# Patient Record
Sex: MALE | Race: WHITE | URBAN - METROPOLITAN AREA
[De-identification: names, ages, dates, MRNs, and addresses within clinical notes are randomized per-mention and may not be internally consistent; named-entity substitution may affect disease eponyms.]

---

## 2024-10-05 ENCOUNTER — APPOINTMENT (OUTPATIENT)
Age: 69
DRG: 321 | End: 2024-10-05
Attending: INTERNAL MEDICINE
Payer: MEDICARE

## 2024-10-05 ENCOUNTER — APPOINTMENT (OUTPATIENT)
Dept: CT IMAGING | Age: 69
DRG: 321 | End: 2024-10-05
Payer: MEDICARE

## 2024-10-05 ENCOUNTER — HOSPITAL ENCOUNTER (INPATIENT)
Age: 69
LOS: 5 days | Discharge: HOME | DRG: 321 | End: 2024-10-10
Attending: EMERGENCY MEDICINE | Admitting: STUDENT IN AN ORGANIZED HEALTH CARE EDUCATION/TRAINING PROGRAM
Payer: MEDICARE

## 2024-10-05 ENCOUNTER — APPOINTMENT (OUTPATIENT)
Dept: GENERAL RADIOLOGY | Age: 69
DRG: 321 | End: 2024-10-05
Payer: MEDICARE

## 2024-10-05 DIAGNOSIS — I25.5 ISCHEMIC CARDIOMYOPATHY: ICD-10-CM

## 2024-10-05 DIAGNOSIS — I21.3 ST ELEVATION MYOCARDIAL INFARCTION (STEMI), UNSPECIFIED ARTERY (HCC): Primary | ICD-10-CM

## 2024-10-05 DIAGNOSIS — Z48.89 ENCOUNTER FOR OTHER SPECIFIED SURGICAL AFTERCARE: ICD-10-CM

## 2024-10-05 DIAGNOSIS — I46.9 CARDIAC ARREST: ICD-10-CM

## 2024-10-05 DIAGNOSIS — I21.29 ACUTE POSTERIOR MYOCARDIAL INFARCTION (HCC): ICD-10-CM

## 2024-10-05 DIAGNOSIS — I49.01 VENTRICULAR FIBRILLATION: ICD-10-CM

## 2024-10-05 DIAGNOSIS — I21.3 STEMI (ST ELEVATION MYOCARDIAL INFARCTION) (HCC): ICD-10-CM

## 2024-10-05 LAB
ALBUMIN SERPL-MCNC: 1.9 G/DL (ref 3.4–5)
ALBUMIN/GLOB SERPL: 1.6 {RATIO} (ref 1.1–2.2)
ALP SERPL-CCNC: 39 U/L (ref 40–129)
ALT SERPL-CCNC: 143 U/L (ref 10–40)
ANION GAP SERPL CALCULATED.3IONS-SCNC: 12 MMOL/L (ref 3–16)
ANION GAP SERPL CALCULATED.3IONS-SCNC: 8 MMOL/L (ref 3–16)
AST SERPL-CCNC: 99 U/L (ref 15–37)
BASE EXCESS BLDA CALC-SCNC: -5 MMOL/L (ref -3–3)
BASE EXCESS BLDA CALC-SCNC: -6 MMOL/L (ref -3–3)
BASE EXCESS BLDA CALC-SCNC: 0 MMOL/L (ref -3–3)
BASE EXCESS BLDV CALC-SCNC: -16 MMOL/L (ref -3–3)
BASOPHILS # BLD: 0.1 K/UL (ref 0–0.2)
BASOPHILS # BLD: 0.1 K/UL (ref 0–0.2)
BASOPHILS NFR BLD: 0.3 %
BASOPHILS NFR BLD: 0.8 %
BILIRUB SERPL-MCNC: <0.2 MG/DL (ref 0–1)
BUN SERPL-MCNC: 12 MG/DL (ref 7–20)
BUN SERPL-MCNC: 8 MG/DL (ref 7–20)
CA-I BLD-SCNC: 1.21 MMOL/L (ref 1.12–1.32)
CA-I BLD-SCNC: 1.3 MMOL/L (ref 1.12–1.32)
CALCIUM SERPL-MCNC: 4.2 MG/DL (ref 8.3–10.6)
CALCIUM SERPL-MCNC: 8.8 MG/DL (ref 8.3–10.6)
CHLORIDE BLD-SCNC: 108 MMOL/L (ref 99–110)
CHLORIDE SERPL-SCNC: 104 MMOL/L (ref 99–110)
CHLORIDE SERPL-SCNC: 120 MMOL/L (ref 99–110)
CO2 BLDA-SCNC: 22 MMOL/L
CO2 BLDA-SCNC: 24 MMOL/L
CO2 BLDA-SCNC: 28 MMOL/L
CO2 BLDV-SCNC: 11 MMOL/L
CO2 SERPL-SCNC: 12 MMOL/L (ref 21–32)
CO2 SERPL-SCNC: 26 MMOL/L (ref 21–32)
COHGB MFR BLDV: 2.9 % (ref 0–1.5)
CREAT SERPL-MCNC: 1 MG/DL (ref 0.8–1.3)
CREAT SERPL-MCNC: <0.5 MG/DL (ref 0.8–1.3)
DEPRECATED RDW RBC AUTO: 13.2 % (ref 12.4–15.4)
DEPRECATED RDW RBC AUTO: 13.3 % (ref 12.4–15.4)
ECHO AO ROOT DIAM: 3.6 CM
ECHO AO ROOT INDEX: 1.75 CM/M2
ECHO AV AREA PEAK VELOCITY: 2.4 CM2
ECHO AV AREA/BSA PEAK VELOCITY: 1.2 CM2/M2
ECHO AV CUSP MM: 2.3 CM
ECHO AV PEAK GRADIENT: 5 MMHG
ECHO AV PEAK VELOCITY: 1.2 M/S
ECHO AV VELOCITY RATIO: 0.58
ECHO EST RA PRESSURE: 8 MMHG
ECHO IVC PROX: 1.9 CM
ECHO LA AREA 4C: 14.4 CM2
ECHO LA DIAMETER INDEX: 1.5 CM/M2
ECHO LA DIAMETER: 3.1 CM
ECHO LA MAJOR AXIS: 4.7 CM
ECHO LA TO AORTIC ROOT RATIO: 0.86
ECHO LA VOL MOD A2C: 23 ML (ref 18–58)
ECHO LA VOL MOD A4C: 36 ML (ref 18–58)
ECHO LA VOLUME INDEX MOD A2C: 11 ML/M2 (ref 16–34)
ECHO LA VOLUME INDEX MOD A4C: 17 ML/M2 (ref 16–34)
ECHO LV E' LATERAL VELOCITY: 7.9 CM/S
ECHO LV E' SEPTAL VELOCITY: 6.2 CM/S
ECHO LV EDV A2C: 118 ML
ECHO LV EDV A4C: 142 ML
ECHO LV EDV INDEX A4C: 69 ML/M2
ECHO LV EDV NDEX A2C: 57 ML/M2
ECHO LV EF PHYSICIAN: 45 %
ECHO LV EJECTION FRACTION A2C: 58 %
ECHO LV EJECTION FRACTION A4C: 45 %
ECHO LV EJECTION FRACTION BIPLANE: 51 % (ref 55–100)
ECHO LV ESV A2C: 50 ML
ECHO LV ESV A4C: 78 ML
ECHO LV ESV INDEX A2C: 24 ML/M2
ECHO LV ESV INDEX A4C: 38 ML/M2
ECHO LV FRACTIONAL SHORTENING: 16 % (ref 28–44)
ECHO LV INTERNAL DIMENSION DIASTOLE INDEX: 2.14 CM/M2
ECHO LV INTERNAL DIMENSION DIASTOLIC: 4.4 CM (ref 4.2–5.9)
ECHO LV INTERNAL DIMENSION SYSTOLIC INDEX: 1.8 CM/M2
ECHO LV INTERNAL DIMENSION SYSTOLIC: 3.7 CM
ECHO LV ISOVOLUMETRIC RELAXATION TIME (IVRT): 106 MS
ECHO LV IVSD: 1 CM (ref 0.6–1)
ECHO LV MASS 2D: 147.8 G (ref 88–224)
ECHO LV MASS INDEX 2D: 71.8 G/M2 (ref 49–115)
ECHO LV POSTERIOR WALL DIASTOLIC: 1 CM (ref 0.6–1)
ECHO LV RELATIVE WALL THICKNESS RATIO: 0.45
ECHO LVOT AREA: 3.8 CM2
ECHO LVOT DIAM: 2.2 CM
ECHO LVOT MEAN GRADIENT: 1 MMHG
ECHO LVOT PEAK GRADIENT: 2 MMHG
ECHO LVOT PEAK VELOCITY: 0.7 M/S
ECHO LVOT STROKE VOLUME INDEX: 29.1 ML/M2
ECHO LVOT SV: 60 ML
ECHO LVOT VTI: 15.8 CM
ECHO MV A VELOCITY: 0.6 M/S
ECHO MV E DECELERATION TIME (DT): 187 MS
ECHO MV E VELOCITY: 0.72 M/S
ECHO MV E/A RATIO: 1.2
ECHO MV E/E' LATERAL: 9.11
ECHO MV E/E' RATIO (AVERAGED): 10.36
ECHO MV E/E' SEPTAL: 11.61
ECHO RA AREA 4C: 14.8 CM2
ECHO RA END SYSTOLIC VOLUME APICAL 4 CHAMBER INDEX BSA: 17 ML/M2
ECHO RA VOLUME: 35 ML
ECHO RIGHT VENTRICULAR SYSTOLIC PRESSURE (RVSP): 43 MMHG
ECHO RV FREE WALL PEAK S': 10.9 CM/S
ECHO RV INTERNAL DIMENSION: 3.3 CM
ECHO RV TAPSE: 1.6 CM (ref 1.7–?)
ECHO TV REGURGITANT MAX VELOCITY: 2.97 M/S
ECHO TV REGURGITANT PEAK GRADIENT: 35 MMHG
EOSINOPHIL # BLD: 0 K/UL (ref 0–0.6)
EOSINOPHIL # BLD: 0.1 K/UL (ref 0–0.6)
EOSINOPHIL NFR BLD: 0.1 %
EOSINOPHIL NFR BLD: 1.1 %
GFR SERPLBLD CREATININE-BSD FMLA CKD-EPI: 83 ML/MIN/{1.73_M2}
GFR SERPLBLD CREATININE-BSD FMLA CKD-EPI: >90 ML/MIN/{1.73_M2}
GLUCOSE BLD-MCNC: 157 MG/DL (ref 70–99)
GLUCOSE BLD-MCNC: 163 MG/DL (ref 70–99)
GLUCOSE BLD-MCNC: 187 MG/DL (ref 70–99)
GLUCOSE SERPL-MCNC: 104 MG/DL (ref 70–99)
GLUCOSE SERPL-MCNC: 161 MG/DL (ref 70–99)
HCO3 BLDA-SCNC: 20.9 MMOL/L (ref 21–29)
HCO3 BLDA-SCNC: 22.1 MMOL/L (ref 21–29)
HCO3 BLDA-SCNC: 26 MMOL/L (ref 21–29)
HCO3 BLDV-SCNC: 9.8 MMOL/L (ref 23–29)
HCT VFR BLD AUTO: 29.5 % (ref 40.5–52.5)
HCT VFR BLD AUTO: 37 % (ref 40.5–52.5)
HCT VFR BLD AUTO: 40.3 % (ref 40.5–52.5)
HGB BLD CALC-MCNC: 12.7 GM/DL (ref 13.5–17.5)
HGB BLD-MCNC: 10 G/DL (ref 13.5–17.5)
HGB BLD-MCNC: 13.7 G/DL (ref 13.5–17.5)
LACTATE BLD-SCNC: 1.8 MMOL/L (ref 0.4–2)
LACTATE BLD-SCNC: 1.87 MMOL/L (ref 0.4–2)
LACTATE BLDV-SCNC: 5.4 MMOL/L (ref 0.4–2)
LYMPHOCYTES # BLD: 1.1 K/UL (ref 1–5.1)
LYMPHOCYTES # BLD: 3.8 K/UL (ref 1–5.1)
LYMPHOCYTES NFR BLD: 49.1 %
LYMPHOCYTES NFR BLD: 7.4 %
MAGNESIUM SERPL-MCNC: 1.1 MG/DL (ref 1.8–2.4)
MCH RBC QN AUTO: 31.8 PG (ref 26–34)
MCH RBC QN AUTO: 31.9 PG (ref 26–34)
MCHC RBC AUTO-ENTMCNC: 33.8 G/DL (ref 31–36)
MCHC RBC AUTO-ENTMCNC: 33.9 G/DL (ref 31–36)
MCV RBC AUTO: 94 FL (ref 80–100)
MCV RBC AUTO: 94.2 FL (ref 80–100)
METHGB MFR BLDV: 0.3 %
MONOCYTES # BLD: 0.7 K/UL (ref 0–1.3)
MONOCYTES # BLD: 1.2 K/UL (ref 0–1.3)
MONOCYTES NFR BLD: 8.3 %
MONOCYTES NFR BLD: 9.2 %
NEUTROPHILS # BLD: 12.4 K/UL (ref 1.7–7.7)
NEUTROPHILS # BLD: 3.1 K/UL (ref 1.7–7.7)
NEUTROPHILS NFR BLD: 39.8 %
NEUTROPHILS NFR BLD: 83.9 %
NT-PROBNP SERPL-MCNC: 61 PG/ML (ref 0–124)
O2 THERAPY: ABNORMAL
PCO2 BLDA: 45.9 MM HG (ref 35–45)
PCO2 BLDA: 47.5 MM HG (ref 35–45)
PCO2 BLDA: 51.2 MM HG (ref 35–45)
PCO2 BLDV: 23.6 MMHG (ref 40–50)
PERFORMED ON: ABNORMAL
PH BLDA: 7.25 [PH] (ref 7.35–7.45)
PH BLDA: 7.29 [PH] (ref 7.35–7.45)
PH BLDA: 7.31 [PH] (ref 7.35–7.45)
PH BLDV: 7.24 [PH] (ref 7.35–7.45)
PH BLDV: 7.24 [PH] (ref 7.35–7.45)
PLATELET # BLD AUTO: 137 K/UL (ref 135–450)
PLATELET # BLD AUTO: 209 K/UL (ref 135–450)
PMV BLD AUTO: 7.4 FL (ref 5–10.5)
PMV BLD AUTO: 7.5 FL (ref 5–10.5)
PO2 BLDA: 120.1 MM HG (ref 75–108)
PO2 BLDA: 78.8 MM HG (ref 75–108)
PO2 BLDA: 80.5 MM HG (ref 75–108)
PO2 BLDV: 78.7 MMHG (ref 25–40)
POC ACT LR: 278 SEC
POC ACT LR: 334 SEC
POC ACT LR: 346 SEC
POC CREATININE: 0.7 MG/DL (ref 0.8–1.3)
POC SAMPLE TYPE: ABNORMAL
POTASSIUM BLD-SCNC: 3.7 MMOL/L (ref 3.5–5.1)
POTASSIUM BLD-SCNC: 3.8 MMOL/L (ref 3.5–5.1)
POTASSIUM SERPL-SCNC: 1.9 MMOL/L (ref 3.5–5.1)
POTASSIUM SERPL-SCNC: 4.5 MMOL/L (ref 3.5–5.1)
PROT SERPL-MCNC: 3.1 G/DL (ref 6.4–8.2)
RBC # BLD AUTO: 3.14 M/UL (ref 4.2–5.9)
RBC # BLD AUTO: 4.28 M/UL (ref 4.2–5.9)
SAO2 % BLDA: 94 % (ref 93–100)
SAO2 % BLDA: 94 % (ref 93–100)
SAO2 % BLDA: 98 % (ref 93–100)
SAO2 % BLDV: 94 %
SODIUM BLD-SCNC: 137 MMOL/L (ref 136–145)
SODIUM SERPL-SCNC: 138 MMOL/L (ref 136–145)
SODIUM SERPL-SCNC: 144 MMOL/L (ref 136–145)
TROPONIN, HIGH SENSITIVITY: <6 NG/L (ref 0–22)
WBC # BLD AUTO: 14.7 K/UL (ref 4–11)
WBC # BLD AUTO: 7.7 K/UL (ref 4–11)

## 2024-10-05 PROCEDURE — C1769 GUIDE WIRE: HCPCS | Performed by: INTERNAL MEDICINE

## 2024-10-05 PROCEDURE — C1725 CATH, TRANSLUMIN NON-LASER: HCPCS | Performed by: INTERNAL MEDICINE

## 2024-10-05 PROCEDURE — 99291 CRITICAL CARE FIRST HOUR: CPT | Performed by: INTERNAL MEDICINE

## 2024-10-05 PROCEDURE — 027034Z DILATION OF CORONARY ARTERY, ONE ARTERY WITH DRUG-ELUTING INTRALUMINAL DEVICE, PERCUTANEOUS APPROACH: ICD-10-PCS | Performed by: INTERNAL MEDICINE

## 2024-10-05 PROCEDURE — 99153 MOD SED SAME PHYS/QHP EA: CPT | Performed by: INTERNAL MEDICINE

## 2024-10-05 PROCEDURE — 92978 ENDOLUMINL IVUS OCT C 1ST: CPT | Performed by: INTERNAL MEDICINE

## 2024-10-05 PROCEDURE — 74176 CT ABD & PELVIS W/O CONTRAST: CPT

## 2024-10-05 PROCEDURE — 2500000003 HC RX 250 WO HCPCS: Performed by: INTERNAL MEDICINE

## 2024-10-05 PROCEDURE — 99152 MOD SED SAME PHYS/QHP 5/>YRS: CPT | Performed by: INTERNAL MEDICINE

## 2024-10-05 PROCEDURE — 2500000003 HC RX 250 WO HCPCS: Performed by: EMERGENCY MEDICINE

## 2024-10-05 PROCEDURE — 85014 HEMATOCRIT: CPT

## 2024-10-05 PROCEDURE — 74018 RADEX ABDOMEN 1 VIEW: CPT

## 2024-10-05 PROCEDURE — 83880 ASSAY OF NATRIURETIC PEPTIDE: CPT

## 2024-10-05 PROCEDURE — 2580000003 HC RX 258: Performed by: INTERNAL MEDICINE

## 2024-10-05 PROCEDURE — 94761 N-INVAS EAR/PLS OXIMETRY MLT: CPT

## 2024-10-05 PROCEDURE — C8929 TTE W OR WO FOL WCON,DOPPLER: HCPCS

## 2024-10-05 PROCEDURE — 6360000002 HC RX W HCPCS: Performed by: INTERNAL MEDICINE

## 2024-10-05 PROCEDURE — B2151ZZ FLUOROSCOPY OF LEFT HEART USING LOW OSMOLAR CONTRAST: ICD-10-PCS | Performed by: INTERNAL MEDICINE

## 2024-10-05 PROCEDURE — 82803 BLOOD GASES ANY COMBINATION: CPT

## 2024-10-05 PROCEDURE — C1753 CATH, INTRAVAS ULTRASOUND: HCPCS | Performed by: INTERNAL MEDICINE

## 2024-10-05 PROCEDURE — 36556 INSERT NON-TUNNEL CV CATH: CPT | Performed by: INTERNAL MEDICINE

## 2024-10-05 PROCEDURE — 82565 ASSAY OF CREATININE: CPT

## 2024-10-05 PROCEDURE — C1751 CATH, INF, PER/CENT/MIDLINE: HCPCS | Performed by: INTERNAL MEDICINE

## 2024-10-05 PROCEDURE — 5A1221J PERFORMANCE OF CARDIAC OUTPUT, CONTINUOUS, AUTOMATED: ICD-10-PCS | Performed by: INTERNAL MEDICINE

## 2024-10-05 PROCEDURE — 6370000000 HC RX 637 (ALT 250 FOR IP)

## 2024-10-05 PROCEDURE — 85347 COAGULATION TIME ACTIVATED: CPT

## 2024-10-05 PROCEDURE — 82947 ASSAY GLUCOSE BLOOD QUANT: CPT

## 2024-10-05 PROCEDURE — 99285 EMERGENCY DEPT VISIT HI MDM: CPT

## 2024-10-05 PROCEDURE — 6360000004 HC RX CONTRAST MEDICATION: Performed by: INTERNAL MEDICINE

## 2024-10-05 PROCEDURE — C1874 STENT, COATED/COV W/DEL SYS: HCPCS | Performed by: INTERNAL MEDICINE

## 2024-10-05 PROCEDURE — 93005 ELECTROCARDIOGRAM TRACING: CPT | Performed by: INTERNAL MEDICINE

## 2024-10-05 PROCEDURE — 36415 COLL VENOUS BLD VENIPUNCTURE: CPT

## 2024-10-05 PROCEDURE — 6360000002 HC RX W HCPCS: Performed by: EMERGENCY MEDICINE

## 2024-10-05 PROCEDURE — B2111ZZ FLUOROSCOPY OF MULTIPLE CORONARY ARTERIES USING LOW OSMOLAR CONTRAST: ICD-10-PCS | Performed by: INTERNAL MEDICINE

## 2024-10-05 PROCEDURE — 5A1945Z RESPIRATORY VENTILATION, 24-96 CONSECUTIVE HOURS: ICD-10-PCS | Performed by: INTERNAL MEDICINE

## 2024-10-05 PROCEDURE — 96374 THER/PROPH/DIAG INJ IV PUSH: CPT

## 2024-10-05 PROCEDURE — 84295 ASSAY OF SERUM SODIUM: CPT

## 2024-10-05 PROCEDURE — 94002 VENT MGMT INPAT INIT DAY: CPT

## 2024-10-05 PROCEDURE — 4A023N7 MEASUREMENT OF CARDIAC SAMPLING AND PRESSURE, LEFT HEART, PERCUTANEOUS APPROACH: ICD-10-PCS | Performed by: INTERNAL MEDICINE

## 2024-10-05 PROCEDURE — 92941 PRQ TRLML REVSC TOT OCCL AMI: CPT | Performed by: INTERNAL MEDICINE

## 2024-10-05 PROCEDURE — 93306 TTE W/DOPPLER COMPLETE: CPT | Performed by: INTERNAL MEDICINE

## 2024-10-05 PROCEDURE — 82330 ASSAY OF CALCIUM: CPT

## 2024-10-05 PROCEDURE — 76937 US GUIDE VASCULAR ACCESS: CPT | Performed by: INTERNAL MEDICINE

## 2024-10-05 PROCEDURE — 93458 L HRT ARTERY/VENTRICLE ANGIO: CPT | Performed by: INTERNAL MEDICINE

## 2024-10-05 PROCEDURE — 2000000000 HC ICU R&B

## 2024-10-05 PROCEDURE — 71045 X-RAY EXAM CHEST 1 VIEW: CPT

## 2024-10-05 PROCEDURE — 0BH17EZ INSERTION OF ENDOTRACHEAL AIRWAY INTO TRACHEA, VIA NATURAL OR ARTIFICIAL OPENING: ICD-10-PCS | Performed by: INTERNAL MEDICINE

## 2024-10-05 PROCEDURE — 0DH67UZ INSERTION OF FEEDING DEVICE INTO STOMACH, VIA NATURAL OR ARTIFICIAL OPENING: ICD-10-PCS | Performed by: INTERNAL MEDICINE

## 2024-10-05 PROCEDURE — 77001 FLUOROGUIDE FOR VEIN DEVICE: CPT | Performed by: INTERNAL MEDICINE

## 2024-10-05 PROCEDURE — 80053 COMPREHEN METABOLIC PANEL: CPT

## 2024-10-05 PROCEDURE — 2580000003 HC RX 258: Performed by: STUDENT IN AN ORGANIZED HEALTH CARE EDUCATION/TRAINING PROGRAM

## 2024-10-05 PROCEDURE — 2709999900 HC NON-CHARGEABLE SUPPLY: Performed by: INTERNAL MEDICINE

## 2024-10-05 PROCEDURE — 31500 INSERT EMERGENCY AIRWAY: CPT

## 2024-10-05 PROCEDURE — 70450 CT HEAD/BRAIN W/O DYE: CPT

## 2024-10-05 PROCEDURE — 84132 ASSAY OF SERUM POTASSIUM: CPT

## 2024-10-05 PROCEDURE — 83605 ASSAY OF LACTIC ACID: CPT

## 2024-10-05 PROCEDURE — 2700000000 HC OXYGEN THERAPY PER DAY

## 2024-10-05 PROCEDURE — 82435 ASSAY OF BLOOD CHLORIDE: CPT

## 2024-10-05 PROCEDURE — 83735 ASSAY OF MAGNESIUM: CPT

## 2024-10-05 PROCEDURE — 84484 ASSAY OF TROPONIN QUANT: CPT

## 2024-10-05 PROCEDURE — 92979 ENDOLUMINL IVUS OCT C EA: CPT | Performed by: INTERNAL MEDICINE

## 2024-10-05 PROCEDURE — B240ZZ3 ULTRASONOGRAPHY OF SINGLE CORONARY ARTERY, INTRAVASCULAR: ICD-10-PCS | Performed by: INTERNAL MEDICINE

## 2024-10-05 PROCEDURE — 3E030XZ INTRODUCTION OF VASOPRESSOR INTO PERIPHERAL VEIN, OPEN APPROACH: ICD-10-PCS | Performed by: INTERNAL MEDICINE

## 2024-10-05 PROCEDURE — C1887 CATHETER, GUIDING: HCPCS | Performed by: INTERNAL MEDICINE

## 2024-10-05 PROCEDURE — 85025 COMPLETE CBC W/AUTO DIFF WBC: CPT

## 2024-10-05 PROCEDURE — C1894 INTRO/SHEATH, NON-LASER: HCPCS | Performed by: INTERNAL MEDICINE

## 2024-10-05 PROCEDURE — 93005 ELECTROCARDIOGRAM TRACING: CPT | Performed by: EMERGENCY MEDICINE

## 2024-10-05 DEVICE — STENT ONYXNG35030UX ONYX 3.50X30RX
Type: IMPLANTABLE DEVICE | Status: FUNCTIONAL
Brand: ONYX FRONTIER™

## 2024-10-05 RX ORDER — EPTIFIBATIDE 0.75 MG/ML
2 INJECTION, SOLUTION INTRAVENOUS CONTINUOUS
Status: DISPENSED | OUTPATIENT
Start: 2024-10-05 | End: 2024-10-06

## 2024-10-05 RX ORDER — ONDANSETRON 2 MG/ML
4 INJECTION INTRAMUSCULAR; INTRAVENOUS EVERY 6 HOURS PRN
Status: DISCONTINUED | OUTPATIENT
Start: 2024-10-05 | End: 2024-10-10 | Stop reason: HOSPADM

## 2024-10-05 RX ORDER — POTASSIUM CHLORIDE 7.45 MG/ML
10 INJECTION INTRAVENOUS PRN
Status: DISCONTINUED | OUTPATIENT
Start: 2024-10-05 | End: 2024-10-08

## 2024-10-05 RX ORDER — NITROGLYCERIN 20 MG/100ML
INJECTION INTRAVENOUS CONTINUOUS PRN
Status: COMPLETED | OUTPATIENT
Start: 2024-10-05 | End: 2024-10-05

## 2024-10-05 RX ORDER — POLYETHYLENE GLYCOL 3350 17 G/17G
17 POWDER, FOR SOLUTION ORAL DAILY PRN
Status: DISCONTINUED | OUTPATIENT
Start: 2024-10-05 | End: 2024-10-10 | Stop reason: HOSPADM

## 2024-10-05 RX ORDER — HEPARIN SODIUM 1000 [USP'U]/ML
INJECTION, SOLUTION INTRAVENOUS; SUBCUTANEOUS PRN
Status: DISCONTINUED | OUTPATIENT
Start: 2024-10-05 | End: 2024-10-05 | Stop reason: HOSPADM

## 2024-10-05 RX ORDER — PHENYLEPHRINE HYDROCHLORIDE 10 MG/ML
INJECTION INTRAVENOUS PRN
Status: DISCONTINUED | OUTPATIENT
Start: 2024-10-05 | End: 2024-10-05 | Stop reason: HOSPADM

## 2024-10-05 RX ORDER — ROCURONIUM BROMIDE 10 MG/ML
INJECTION, SOLUTION INTRAVENOUS DAILY PRN
Status: COMPLETED | OUTPATIENT
Start: 2024-10-05 | End: 2024-10-05

## 2024-10-05 RX ORDER — NICARDIPINE HYDROCHLORIDE 2.5 MG/ML
INJECTION INTRAVENOUS PRN
Status: DISCONTINUED | OUTPATIENT
Start: 2024-10-05 | End: 2024-10-05 | Stop reason: HOSPADM

## 2024-10-05 RX ORDER — SODIUM CHLORIDE 0.9 % (FLUSH) 0.9 %
5-40 SYRINGE (ML) INJECTION EVERY 12 HOURS SCHEDULED
Status: DISCONTINUED | OUTPATIENT
Start: 2024-10-05 | End: 2024-10-10 | Stop reason: HOSPADM

## 2024-10-05 RX ORDER — PANTOPRAZOLE SODIUM 40 MG/10ML
40 INJECTION, POWDER, LYOPHILIZED, FOR SOLUTION INTRAVENOUS DAILY
Status: DISCONTINUED | OUTPATIENT
Start: 2024-10-05 | End: 2024-10-10 | Stop reason: HOSPADM

## 2024-10-05 RX ORDER — MIDAZOLAM HYDROCHLORIDE 1 MG/ML
INJECTION INTRAMUSCULAR; INTRAVENOUS PRN
Status: DISCONTINUED | OUTPATIENT
Start: 2024-10-05 | End: 2024-10-05 | Stop reason: HOSPADM

## 2024-10-05 RX ORDER — SODIUM CHLORIDE 0.9 % (FLUSH) 0.9 %
5-40 SYRINGE (ML) INJECTION PRN
Status: DISCONTINUED | OUTPATIENT
Start: 2024-10-05 | End: 2024-10-10 | Stop reason: HOSPADM

## 2024-10-05 RX ORDER — ACETAMINOPHEN 325 MG/1
650 TABLET ORAL EVERY 6 HOURS PRN
Status: DISCONTINUED | OUTPATIENT
Start: 2024-10-05 | End: 2024-10-10 | Stop reason: HOSPADM

## 2024-10-05 RX ORDER — HEPARIN SODIUM 1000 [USP'U]/ML
4000 INJECTION, SOLUTION INTRAVENOUS; SUBCUTANEOUS ONCE
Status: COMPLETED | OUTPATIENT
Start: 2024-10-05 | End: 2024-10-05

## 2024-10-05 RX ORDER — SODIUM CHLORIDE 9 MG/ML
INJECTION, SOLUTION INTRAVENOUS PRN
Status: DISCONTINUED | OUTPATIENT
Start: 2024-10-05 | End: 2024-10-10 | Stop reason: HOSPADM

## 2024-10-05 RX ORDER — ASPIRIN 81 MG/1
81 TABLET, CHEWABLE ORAL DAILY
Status: DISCONTINUED | OUTPATIENT
Start: 2024-10-05 | End: 2024-10-10 | Stop reason: HOSPADM

## 2024-10-05 RX ORDER — POTASSIUM CHLORIDE 7.45 MG/ML
10 INJECTION INTRAVENOUS ONCE
Status: DISCONTINUED | OUTPATIENT
Start: 2024-10-05 | End: 2024-10-07

## 2024-10-05 RX ORDER — MAGNESIUM SULFATE IN WATER 40 MG/ML
2000 INJECTION, SOLUTION INTRAVENOUS PRN
Status: DISCONTINUED | OUTPATIENT
Start: 2024-10-05 | End: 2024-10-08

## 2024-10-05 RX ORDER — FENTANYL CITRATE 50 UG/ML
INJECTION, SOLUTION INTRAMUSCULAR; INTRAVENOUS PRN
Status: DISCONTINUED | OUTPATIENT
Start: 2024-10-05 | End: 2024-10-05 | Stop reason: HOSPADM

## 2024-10-05 RX ORDER — MIDAZOLAM HYDROCHLORIDE 1 MG/ML
1-10 INJECTION, SOLUTION INTRAVENOUS CONTINUOUS
Status: DISCONTINUED | OUTPATIENT
Start: 2024-10-05 | End: 2024-10-07

## 2024-10-05 RX ORDER — POTASSIUM CHLORIDE 1500 MG/1
40 TABLET, EXTENDED RELEASE ORAL PRN
Status: DISCONTINUED | OUTPATIENT
Start: 2024-10-05 | End: 2024-10-08

## 2024-10-05 RX ORDER — ASPIRIN 81 MG/1
TABLET, CHEWABLE ORAL
Status: COMPLETED
Start: 2024-10-05 | End: 2024-10-05

## 2024-10-05 RX ORDER — ETOMIDATE 2 MG/ML
INJECTION INTRAVENOUS DAILY PRN
Status: COMPLETED | OUTPATIENT
Start: 2024-10-05 | End: 2024-10-05

## 2024-10-05 RX ORDER — ACETAMINOPHEN 650 MG/1
650 SUPPOSITORY RECTAL EVERY 6 HOURS PRN
Status: DISCONTINUED | OUTPATIENT
Start: 2024-10-05 | End: 2024-10-10 | Stop reason: HOSPADM

## 2024-10-05 RX ORDER — ASPIRIN 81 MG/1
324 TABLET, CHEWABLE ORAL ONCE
Status: COMPLETED | OUTPATIENT
Start: 2024-10-05 | End: 2024-10-05

## 2024-10-05 RX ORDER — ATORVASTATIN CALCIUM 80 MG/1
80 TABLET, FILM COATED ORAL NIGHTLY
Status: DISCONTINUED | OUTPATIENT
Start: 2024-10-05 | End: 2024-10-10 | Stop reason: HOSPADM

## 2024-10-05 RX ORDER — IOPAMIDOL 755 MG/ML
INJECTION, SOLUTION INTRAVASCULAR PRN
Status: DISCONTINUED | OUTPATIENT
Start: 2024-10-05 | End: 2024-10-05 | Stop reason: HOSPADM

## 2024-10-05 RX ORDER — ONDANSETRON 4 MG/1
4 TABLET, ORALLY DISINTEGRATING ORAL EVERY 8 HOURS PRN
Status: DISCONTINUED | OUTPATIENT
Start: 2024-10-05 | End: 2024-10-10 | Stop reason: HOSPADM

## 2024-10-05 RX ORDER — FENTANYL CITRATE-0.9 % NACL/PF 10 MCG/ML
25-200 PLASTIC BAG, INJECTION (ML) INTRAVENOUS CONTINUOUS
Status: DISCONTINUED | OUTPATIENT
Start: 2024-10-05 | End: 2024-10-07

## 2024-10-05 RX ORDER — ENOXAPARIN SODIUM 100 MG/ML
40 INJECTION SUBCUTANEOUS DAILY
Status: DISCONTINUED | OUTPATIENT
Start: 2024-10-06 | End: 2024-10-10 | Stop reason: HOSPADM

## 2024-10-05 RX ADMIN — PERFLUTREN 1.5 ML: 6.52 INJECTION, SUSPENSION INTRAVENOUS at 19:39

## 2024-10-05 RX ADMIN — ROCURONIUM BROMIDE 100 MG: 50 INJECTION, SOLUTION INTRAVENOUS at 15:18

## 2024-10-05 RX ADMIN — Medication 50 MCG/HR: at 15:33

## 2024-10-05 RX ADMIN — MIDAZOLAM IN SODIUM CHLORIDE 2 MG/HR: 1 INJECTION INTRAVENOUS at 15:32

## 2024-10-05 RX ADMIN — SODIUM CHLORIDE, PRESERVATIVE FREE 10 ML: 5 INJECTION INTRAVENOUS at 23:24

## 2024-10-05 RX ADMIN — AMPICILLIN SODIUM AND SULBACTAM SODIUM 3000 MG: 2; 1 INJECTION, POWDER, FOR SOLUTION INTRAMUSCULAR; INTRAVENOUS at 23:57

## 2024-10-05 RX ADMIN — EPTIFIBATIDE 2 MCG/KG/MIN: 0.75 INJECTION INTRAVENOUS at 17:15

## 2024-10-05 RX ADMIN — ASPIRIN 324 MG: 81 TABLET, CHEWABLE ORAL at 16:04

## 2024-10-05 RX ADMIN — HEPARIN SODIUM 4000 UNITS: 1000 INJECTION INTRAVENOUS; SUBCUTANEOUS at 16:19

## 2024-10-05 RX ADMIN — EPTIFIBATIDE 2 MCG/KG/MIN: 0.75 INJECTION INTRAVENOUS at 20:39

## 2024-10-05 RX ADMIN — ETOMIDATE 20 MG: 2 INJECTION INTRAVENOUS at 15:17

## 2024-10-05 RX ADMIN — ASPIRIN 81 MG 324 MG: 81 TABLET ORAL at 16:04

## 2024-10-05 RX ADMIN — TICAGRELOR 180 MG: 90 TABLET ORAL at 16:05

## 2024-10-05 ASSESSMENT — LIFESTYLE VARIABLES
HOW OFTEN DO YOU HAVE A DRINK CONTAINING ALCOHOL: PATIENT UNABLE TO ANSWER
HOW MANY STANDARD DRINKS CONTAINING ALCOHOL DO YOU HAVE ON A TYPICAL DAY: PATIENT UNABLE TO ANSWER

## 2024-10-05 ASSESSMENT — PAIN SCALES - GENERAL
PAINLEVEL_OUTOF10: 0

## 2024-10-05 ASSESSMENT — PULMONARY FUNCTION TESTS
PIF_VALUE: 19
PIF_VALUE: 25
PIF_VALUE: 25

## 2024-10-05 NOTE — PROGRESS NOTES
Brief Pre-Op Note/Sedation Assessment      Chano Clay  1955  [id number]  4:34 PM    Planned Procedure: Cardiac Catheterization Procedure  Post Procedure Plan: Return to same level of care  Consent: Consent was unable to be obtained due to patient's condition.        Chief Complaint:   Chest Pain/Pressure  STEMI      Indications for Cath Procedure:  Presentation:  ACS <= 24 hrs and Resuscitated Cardiac Arrest  2.  Anginal Classification within 2 weeks:  CCS IV - Inability to perform any activity without angina or angina at rest, i.e., severe limitation  3.  Angina Symptoms Assessment:  Typical Chest Pain  4.  Heart Failure Class within last 2 weeks:  No symptoms  5.  Cardiovascular Instability:  Yes:  Ventricular arrhythmias, Persistent ischemic symptoms (CP, ST Elevation), Decompensating heart failure, and Cardiogenic shock    Prior Ischemic Workup/Eval:  Pre-Procedural Medications: Yes: Aspirin  2.   Stress Test Completed?  No    Does Patient need surgery?  Cath Valve Surgery:  No    Pre-Procedure Medical History:  Vital Signs:  BP (!) 144/84   Pulse 79   Resp 15   Ht 1.829 m (6')   SpO2 97%     Allergies:  No Known Allergies  Medications:    Current Facility-Administered Medications   Medication Dose Route Frequency Provider Last Rate Last Admin    fentaNYL (SUBLIMAZE) 1,000 mcg in sodium chloride 0.9% 100 mL infusion   mcg/hr IntraVENous Continuous Kris Smith MD 5 mL/hr at 10/05/24 1533 50 mcg/hr at 10/05/24 1533    midazolam (VERSED) 100mg/100mL in NS infusion  1-10 mg/hr IntraVENous Continuous Kris Smith MD 2 mL/hr at 10/05/24 1532 2 mg/hr at 10/05/24 1532    sodium chloride flush 0.9 % injection 5-40 mL  5-40 mL IntraVENous 2 times per day Huan Pollard N, DO        sodium chloride flush 0.9 % injection 5-40 mL  5-40 mL IntraVENous PRN Huan Pollard, DO        0.9 % sodium chloride infusion   IntraVENous PRN Huan Pollard, DO        potassium chloride (KLOR-CON M)
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
## 2024-10-05 NOTE — CONSULTS
10/05/2024 03:13 PM    GLUCOSE 104 10/05/2024 03:13 PM    CALCIUM 4.2 10/05/2024 03:13 PM    BILITOT <0.2 10/05/2024 03:13 PM    ALKPHOS 39 10/05/2024 03:13 PM    AST 99 10/05/2024 03:13 PM     10/05/2024 03:13 PM     PT/INR:  No results found for: \"PTINR\"  HgBA1c:No results found for: \"LABA1C\"  Lab Results   Component Value Date    TROPONINI <0.006 12/04/2009         Cardiac Data     Last EKG:    As in HPI with lateral ST elevation possible posterior STEMI versus precordial ST depression, no old EKG for comparison    Echo:Stress Test:Cath: None in our chart    Studies:     Cxr  Satisfactory ET tube placement.     Diffuse patchy airspace opacity especially in the upper lung zones.     Cardiac enlargement.     Consider CT scan of the chest to further evaluate.       Ct head    IMPRESSION:  No acute intracranial hemorrhage or acute cerebral infarction identified.       I have reviewed labs and imaging/xray/diagnostic testing in this note.    Assessment and Plan          Patient Active Problem List   Diagnosis    Cardiac arrest       Cardiac arrest, probable STEMI, ventricular fibrillation, acute respiratory failure, labs have shown hypokalemia, hypocalcemia, lactic acidosis, plan for emergency heart catheterization, prognosis is guarded, discussed with patient's son, he understands.    Patient required evaluation in the emergency room which included assessment for possible trauma, he required pan CT scans and looking for alternate diagnoses from STEMI, these are preliminarily negative.  He also required additional stabilization in the emergency room including stabilization of airway.  As such she cannot immediately proceed to the Cath Lab.    Critical care x 39 minutes    Thank you for allowing us to participate in the care of Chano Clay. Please call me with any questions [phone].    Vidal Paul MD, [facility]   Interventional Cardiologist  [facility]  [phone] [place] Office  [phone]  [phone] [facility]  10/5/2024 4:28 PM

## 2024-10-05 NOTE — PROGRESS NOTES
10/05/24 1845   Patient Observation   Pulse 77   Respirations 16   SpO2 99 %   Vent Information   $Ventilation $Initial Day   Ventilator Settings   FiO2  40 %   Vt (Set, mL) 500 mL   Resp Rate (Set) 16 bpm   PEEP/CPAP (cmH2O) 5   Vent Patient Data (Readings)   Vt (Measured) 553 mL   Rate Measured 16 br/min   Minute Volume (L/min) 8.39 Liters   I:E Ratio 1:2.3
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
## 2024-10-05 NOTE — PROGRESS NOTES
10/05/24 1627   Patient Observation   Pulse 71   Respirations 15   SpO2 100 %   Patient Transport   Time Spent Transporting 16-30   Transport Ventillation Type Transport vent   Transport From Other   Transport Destination   (cath lab)   Transport Destination ICU   Emergency Equipment Included Yes

## 2024-10-05 NOTE — PROGRESS NOTES
10/05/24 1630   Adult IBW   Weight - Scale 83.9 kg (185 lb)   Vent Information   Vent Mode AC/PRVC   Ventilator Settings   FiO2  40 %   Vt (Set, mL) 500 mL   Resp Rate (Set) 16 bpm   PEEP/CPAP (cmH2O) 5   Vent Patient Data (Readings)   Vt (Measured) 473 mL   Peak Inspiratory Pressure (cmH2O) 25 cmH2O   Rate Measured 16 br/min   Minute Volume (L/min) 6.94 Liters   Mean Airway Pressure (cmH2O) 12 cmH20   I:E Ratio 1:2.3   Backup Apnea On   Backup Rate 16 Breaths Per Minute   Backup Vt 500   Vent Alarm Settings   Low Minute Volume (lpm) 2 L/min   Low Exhaled Vt (ml) 200 mL   RR High (bpm) 40 br/min   Additional Respiratoray Assessments   Humidification Source HME   ETT    Placement Date/Time: 10/05/24 1526   Present on Admission/Arrival: No  Placed By: In ED  Airway Type: Cuffed  Airway Tube Size: 8 mm  Location: Oral  Secured At: 25 cm  Measured From: Teeth   Secured At 25 cm   Measured From Teeth   ETT Placement Center   Secured By Commercial tube antonio   Patient Transport   Transport Ventillation Type Transport vent   Transport From ER   Transport Destination Other  (cath lab)

## 2024-10-05 NOTE — PROGRESS NOTES
10/05/24 1545   Patient Observation   Pulse 76   Respirations 16   SpO2 95 %   Patient Transport   Transport Ventillation Type Transport vent   Transport From ER   Transport Destination CT scan   Transport Destination CT scan   Transport Destination ER   Emergency Equipment Included Yes

## 2024-10-05 NOTE — PROGRESS NOTES
10/05/24 1530   Patient Observation   Pulse 93   Respirations 16   SpO2 99 %   Vent Information   Ventilator Initiate Yes   Vent Mode AC/VC   Ventilator Settings   FiO2  50 %   Vt (Set, mL) 500 mL   Resp Rate (Set) 15 bpm   PEEP/CPAP (cmH2O) 5   ETT    Placement Date/Time: 10/05/24 1526   Present on Admission/Arrival: No  Placed By: In ED  Airway Type: Cuffed  Airway Tube Size: 8 mm  Location: Oral  Secured At: 25 cm  Measured From: Teeth   $ Intubation Emergent  $ Yes   Secured At 25 cm   Measured From Teeth   ETT Placement Center   Secured By Commercial tube antonio

## 2024-10-05 NOTE — PROCEDURES
consciousness and vital signs/physiologic status throughout the procedure duration (see Cupid).  <20cc EBL.    Delay in d2b time due to requirement for stabilization in ER post cardiac arrest, need for ct scans to assess for alt diagnoses and due to difficult coronary cannulation d/t short LM.       FINDINGS       LVGRAM    LVEDP 16   GRADIENT ACROSS AORTIC VALVE No significant gradient   LV FUNCTION EF 35-40%   WALL MOTION Mid inferior hypokinesis with possible lateral hypokinesis   MITRAL REGURGITATION Mild         CORONARY ARTERIES    LM Short, less than 10% proximal/mid-distal stenosis.       LAD Proximal calcification is noted with 20 to 30% stenosis, mid discrete 90% stenosis is noted at second diagonal artery which is the largest diagonal artery which itself has proximal-mid 90 to 95% stenosis.  Distal vessel has 10 to 20% stenosis.    D1 has ostial/proximal 80% stenosis, it is a small vessel.       LCX Medium to large size vessel, mild to moderately calcified, it has thrombus noted with 99% stenosis in the proximal-mid segment       RCA Dominant, calcified, proximal/mid 30% stenosis, distal eccentric 80% stenosis.       PERCUTANEOUS INTERVENTION DESCRIPTION     Heparin was used for anticoagulation, patient was given Brilinta loading dose in the emergency room via nasogastric tube.  Patient received Integrilin as well during the procedure with plans to give this for 12 hours.    A 7 Greek VL 3.5 guide was used to intubate the circumflex, workhorse wire was used to cross the lesions in the circumflex.  Second wire was taken and unsuccessful attempt was made to wire the LAD in order to obtain additional angiograms of that vessel which was ultimately obtained towards the end of the procedure.  The lesion was then treated in the circumflex with a 3.5 mm cutting balloon, IVUS was performed which showed a minimal luminal diameter of 3.5 mm in the proximal-mid segments with a lesion length of approximately 28 mm.   As such lesion was stented with Medtronic frontier Geronimo 3.5 x 30 mm drug-eluting stent.  Stent was postdilated with 3.5 mm noncompliant balloon.  Follow-up IVUS showed good stent apposition/expansion, there is no residual dissection or thrombus noted.  There was ROXIE III flow at the end of the procedure and ROXIE II flow before the procedure/PCI.      During procedure, patient was supported with norepinephrine as well as phenylephrine and these were able to be weaned to minimal levels at the end of the procedure.  EKG also improved at the end of procedure.  During procedure, was noted that patient did have movement, and sedatives were given to treat this.  As overall clinical status appeared improved, was felt that additional PCI could be considered of the RCA and LAD at a future date versus consideration toward surgical vascularization pending recovery from this procedure and this procedure was felt to be complete.  Cardiac support devices were contemplated however they are not felt to be necessary given overall improvement towards the end of the case.        CONCLUSIONS:     Successful PCI of circumflex with single drug stent  Residual RCA and LAD disease to be treated medically  Consider staged high risk PCI versus CABG at a later date, this is less likely to be during this hospital admission but potentially at a later date pending recovery from this acute illness/cardiac arrest    Continue medical treat with aspirin, Brilinta, statin, aldactone, start beta-blocker/ACE inhibitor once hemodynamics improved further.

## 2024-10-05 NOTE — PROGRESS NOTES
Patient admitted from Cath lab to bed 239. Admission assessment completed and documented on flowsheets. Four eyes skin assessment completed with SABRINA Arroyo. Chlorhexidine bath given. VSS.

## 2024-10-06 PROBLEM — I47.20 VENTRICULAR TACHYCARDIA (HCC): Status: ACTIVE | Noted: 2024-10-06

## 2024-10-06 PROBLEM — I21.3 ST ELEVATION MYOCARDIAL INFARCTION (STEMI) (HCC): Status: ACTIVE | Noted: 2024-10-06

## 2024-10-06 LAB
ALBUMIN SERPL-MCNC: 3.3 G/DL (ref 3.4–5)
ALBUMIN SERPL-MCNC: 3.3 G/DL (ref 3.4–5)
ALBUMIN/GLOB SERPL: 1.6 {RATIO} (ref 1.1–2.2)
ALP SERPL-CCNC: 73 U/L (ref 40–129)
ALP SERPL-CCNC: 75 U/L (ref 40–129)
ALT SERPL-CCNC: 366 U/L (ref 10–40)
ALT SERPL-CCNC: 398 U/L (ref 10–40)
ANION GAP SERPL CALCULATED.3IONS-SCNC: 9 MMOL/L (ref 3–16)
ANION GAP SERPL CALCULATED.3IONS-SCNC: 9 MMOL/L (ref 3–16)
AST SERPL-CCNC: 299 U/L (ref 15–37)
AST SERPL-CCNC: 328 U/L (ref 15–37)
BASE EXCESS BLDV CALC-SCNC: -0.9 MMOL/L (ref -3–3)
BASOPHILS # BLD: 0 K/UL (ref 0–0.2)
BASOPHILS NFR BLD: 0.3 %
BILIRUB DIRECT SERPL-MCNC: 0.2 MG/DL (ref 0–0.3)
BILIRUB INDIRECT SERPL-MCNC: 0.7 MG/DL (ref 0–1)
BILIRUB SERPL-MCNC: 0.8 MG/DL (ref 0–1)
BILIRUB SERPL-MCNC: 0.9 MG/DL (ref 0–1)
BUN SERPL-MCNC: 12 MG/DL (ref 7–20)
BUN SERPL-MCNC: 12 MG/DL (ref 7–20)
CALCIUM SERPL-MCNC: 8.3 MG/DL (ref 8.3–10.6)
CALCIUM SERPL-MCNC: 8.4 MG/DL (ref 8.3–10.6)
CHLORIDE SERPL-SCNC: 107 MMOL/L (ref 99–110)
CHLORIDE SERPL-SCNC: 107 MMOL/L (ref 99–110)
CO2 BLDV-SCNC: 24 MMOL/L
CO2 SERPL-SCNC: 23 MMOL/L (ref 21–32)
CO2 SERPL-SCNC: 24 MMOL/L (ref 21–32)
COHGB MFR BLDV: 2.1 % (ref 0–1.5)
CREAT SERPL-MCNC: 1 MG/DL (ref 0.8–1.3)
CREAT SERPL-MCNC: 1.1 MG/DL (ref 0.8–1.3)
DEPRECATED RDW RBC AUTO: 13.3 % (ref 12.4–15.4)
EKG ATRIAL RATE: 116 BPM
EKG ATRIAL RATE: 73 BPM
EKG DIAGNOSIS: NORMAL
EKG DIAGNOSIS: NORMAL
EKG P AXIS: 65 DEGREES
EKG P-R INTERVAL: 202 MS
EKG Q-T INTERVAL: 312 MS
EKG Q-T INTERVAL: 414 MS
EKG QRS DURATION: 100 MS
EKG QRS DURATION: 100 MS
EKG QTC CALCULATION (BAZETT): 433 MS
EKG QTC CALCULATION (BAZETT): 456 MS
EKG R AXIS: 42 DEGREES
EKG R AXIS: 71 DEGREES
EKG T AXIS: 71 DEGREES
EKG T AXIS: 84 DEGREES
EKG VENTRICULAR RATE: 116 BPM
EKG VENTRICULAR RATE: 73 BPM
EOSINOPHIL # BLD: 0 K/UL (ref 0–0.6)
EOSINOPHIL NFR BLD: 0.2 %
GFR SERPLBLD CREATININE-BSD FMLA CKD-EPI: 74 ML/MIN/{1.73_M2}
GFR SERPLBLD CREATININE-BSD FMLA CKD-EPI: 83 ML/MIN/{1.73_M2}
GLUCOSE SERPL-MCNC: 118 MG/DL (ref 70–99)
GLUCOSE SERPL-MCNC: 129 MG/DL (ref 70–99)
HCO3 BLDV-SCNC: 23 MMOL/L (ref 23–29)
HCT VFR BLD AUTO: 38 % (ref 40.5–52.5)
HGB BLD-MCNC: 12.8 G/DL (ref 13.5–17.5)
LACTATE BLDV-SCNC: 0.9 MMOL/L (ref 0.4–2)
LYMPHOCYTES # BLD: 1.2 K/UL (ref 1–5.1)
LYMPHOCYTES NFR BLD: 11.3 %
MAGNESIUM SERPL-MCNC: 2 MG/DL (ref 1.8–2.4)
MCH RBC QN AUTO: 31.9 PG (ref 26–34)
MCHC RBC AUTO-ENTMCNC: 33.8 G/DL (ref 31–36)
MCV RBC AUTO: 94.4 FL (ref 80–100)
METHGB MFR BLDV: 0.3 %
MONOCYTES # BLD: 0.9 K/UL (ref 0–1.3)
MONOCYTES NFR BLD: 8.9 %
NEUTROPHILS # BLD: 8.3 K/UL (ref 1.7–7.7)
NEUTROPHILS NFR BLD: 79.3 %
O2 THERAPY: ABNORMAL
PCO2 BLDV: 35.7 MMHG (ref 40–50)
PH BLDV: 7.43 [PH] (ref 7.35–7.45)
PLATELET # BLD AUTO: 170 K/UL (ref 135–450)
PMV BLD AUTO: 7.4 FL (ref 5–10.5)
PO2 BLDV: 99 MMHG (ref 25–40)
POTASSIUM SERPL-SCNC: 4.1 MMOL/L (ref 3.5–5.1)
POTASSIUM SERPL-SCNC: 4.3 MMOL/L (ref 3.5–5.1)
PROT SERPL-MCNC: 5.4 G/DL (ref 6.4–8.2)
PROT SERPL-MCNC: 5.5 G/DL (ref 6.4–8.2)
RBC # BLD AUTO: 4.02 M/UL (ref 4.2–5.9)
SAO2 % BLDV: 98 %
SODIUM SERPL-SCNC: 139 MMOL/L (ref 136–145)
SODIUM SERPL-SCNC: 140 MMOL/L (ref 136–145)
TROPONIN, HIGH SENSITIVITY: 2098 NG/L (ref 0–22)
TROPONIN, HIGH SENSITIVITY: 2236 NG/L (ref 0–22)
TROPONIN, HIGH SENSITIVITY: 2549 NG/L (ref 0–22)
WBC # BLD AUTO: 10.5 K/UL (ref 4–11)

## 2024-10-06 PROCEDURE — 99232 SBSQ HOSP IP/OBS MODERATE 35: CPT | Performed by: INTERNAL MEDICINE

## 2024-10-06 PROCEDURE — 94761 N-INVAS EAR/PLS OXIMETRY MLT: CPT

## 2024-10-06 PROCEDURE — 87077 CULTURE AEROBIC IDENTIFY: CPT

## 2024-10-06 PROCEDURE — 89220 SPUTUM SPECIMEN COLLECTION: CPT

## 2024-10-06 PROCEDURE — 87070 CULTURE OTHR SPECIMN AEROBIC: CPT

## 2024-10-06 PROCEDURE — 6370000000 HC RX 637 (ALT 250 FOR IP): Performed by: INTERNAL MEDICINE

## 2024-10-06 PROCEDURE — 2580000003 HC RX 258: Performed by: INTERNAL MEDICINE

## 2024-10-06 PROCEDURE — 84484 ASSAY OF TROPONIN QUANT: CPT

## 2024-10-06 PROCEDURE — 6360000002 HC RX W HCPCS: Performed by: INTERNAL MEDICINE

## 2024-10-06 PROCEDURE — 82803 BLOOD GASES ANY COMBINATION: CPT

## 2024-10-06 PROCEDURE — 6360000002 HC RX W HCPCS: Performed by: EMERGENCY MEDICINE

## 2024-10-06 PROCEDURE — 94003 VENT MGMT INPAT SUBQ DAY: CPT

## 2024-10-06 PROCEDURE — 87205 SMEAR GRAM STAIN: CPT

## 2024-10-06 PROCEDURE — 99291 CRITICAL CARE FIRST HOUR: CPT | Performed by: INTERNAL MEDICINE

## 2024-10-06 PROCEDURE — 2580000003 HC RX 258: Performed by: STUDENT IN AN ORGANIZED HEALTH CARE EDUCATION/TRAINING PROGRAM

## 2024-10-06 PROCEDURE — 2700000000 HC OXYGEN THERAPY PER DAY

## 2024-10-06 PROCEDURE — 87181 SC STD AGAR DILUTION PER AGT: CPT

## 2024-10-06 PROCEDURE — 93010 ELECTROCARDIOGRAM REPORT: CPT | Performed by: INTERNAL MEDICINE

## 2024-10-06 PROCEDURE — 85025 COMPLETE CBC W/AUTO DIFF WBC: CPT

## 2024-10-06 PROCEDURE — 83605 ASSAY OF LACTIC ACID: CPT

## 2024-10-06 PROCEDURE — 83735 ASSAY OF MAGNESIUM: CPT

## 2024-10-06 PROCEDURE — 2000000000 HC ICU R&B

## 2024-10-06 PROCEDURE — 2500000003 HC RX 250 WO HCPCS: Performed by: EMERGENCY MEDICINE

## 2024-10-06 PROCEDURE — 87186 SC STD MICRODIL/AGAR DIL: CPT

## 2024-10-06 PROCEDURE — 6360000002 HC RX W HCPCS: Performed by: STUDENT IN AN ORGANIZED HEALTH CARE EDUCATION/TRAINING PROGRAM

## 2024-10-06 PROCEDURE — 80053 COMPREHEN METABOLIC PANEL: CPT

## 2024-10-06 RX ORDER — METOPROLOL TARTRATE 25 MG/1
25 TABLET, FILM COATED ORAL 2 TIMES DAILY
Status: DISCONTINUED | OUTPATIENT
Start: 2024-10-06 | End: 2024-10-08

## 2024-10-06 RX ORDER — PROPOFOL 10 MG/ML
5-50 INJECTION, EMULSION INTRAVENOUS CONTINUOUS
Status: DISCONTINUED | OUTPATIENT
Start: 2024-10-06 | End: 2024-10-07

## 2024-10-06 RX ADMIN — SODIUM CHLORIDE, PRESERVATIVE FREE 10 ML: 5 INJECTION INTRAVENOUS at 08:08

## 2024-10-06 RX ADMIN — ASPIRIN 81 MG 81 MG: 81 TABLET ORAL at 08:00

## 2024-10-06 RX ADMIN — ATORVASTATIN CALCIUM 80 MG: 80 TABLET, FILM COATED ORAL at 00:03

## 2024-10-06 RX ADMIN — Medication 100 MCG/HR: at 23:31

## 2024-10-06 RX ADMIN — Medication 75 MCG/HR: at 04:23

## 2024-10-06 RX ADMIN — METOPROLOL TARTRATE 25 MG: 25 TABLET, FILM COATED ORAL at 21:39

## 2024-10-06 RX ADMIN — AMPICILLIN SODIUM AND SULBACTAM SODIUM 3000 MG: 2; 1 INJECTION, POWDER, FOR SOLUTION INTRAMUSCULAR; INTRAVENOUS at 17:48

## 2024-10-06 RX ADMIN — MIDAZOLAM IN SODIUM CHLORIDE 5 MG/HR: 1 INJECTION INTRAVENOUS at 07:49

## 2024-10-06 RX ADMIN — SODIUM CHLORIDE, PRESERVATIVE FREE 10 ML: 5 INJECTION INTRAVENOUS at 21:40

## 2024-10-06 RX ADMIN — METOPROLOL TARTRATE 25 MG: 25 TABLET, FILM COATED ORAL at 12:36

## 2024-10-06 RX ADMIN — AMPICILLIN SODIUM AND SULBACTAM SODIUM 3000 MG: 2; 1 INJECTION, POWDER, FOR SOLUTION INTRAMUSCULAR; INTRAVENOUS at 12:43

## 2024-10-06 RX ADMIN — PROPOFOL 25 MCG/KG/MIN: 10 INJECTION, EMULSION INTRAVENOUS at 21:41

## 2024-10-06 RX ADMIN — TICAGRELOR 90 MG: 90 TABLET ORAL at 08:00

## 2024-10-06 RX ADMIN — ENOXAPARIN SODIUM 40 MG: 100 INJECTION SUBCUTANEOUS at 08:00

## 2024-10-06 RX ADMIN — EPTIFIBATIDE 2 MCG/KG/MIN: 0.75 INJECTION INTRAVENOUS at 03:14

## 2024-10-06 RX ADMIN — TICAGRELOR 90 MG: 90 TABLET ORAL at 21:39

## 2024-10-06 RX ADMIN — PANTOPRAZOLE SODIUM 40 MG: 40 INJECTION, POWDER, FOR SOLUTION INTRAVENOUS at 08:01

## 2024-10-06 RX ADMIN — TICAGRELOR 90 MG: 90 TABLET ORAL at 00:04

## 2024-10-06 RX ADMIN — AMPICILLIN SODIUM AND SULBACTAM SODIUM 3000 MG: 2; 1 INJECTION, POWDER, FOR SOLUTION INTRAMUSCULAR; INTRAVENOUS at 07:52

## 2024-10-06 RX ADMIN — Medication 100 MCG/HR: at 14:18

## 2024-10-06 RX ADMIN — PROPOFOL 20 MCG/KG/MIN: 10 INJECTION, EMULSION INTRAVENOUS at 14:36

## 2024-10-06 RX ADMIN — ATORVASTATIN CALCIUM 80 MG: 80 TABLET, FILM COATED ORAL at 21:39

## 2024-10-06 ASSESSMENT — PULMONARY FUNCTION TESTS
PIF_VALUE: 21
PIF_VALUE: 20
PIF_VALUE: 9
PIF_VALUE: 20
PIF_VALUE: 20
PIF_VALUE: 23

## 2024-10-06 ASSESSMENT — PAIN SCALES - GENERAL
PAINLEVEL_OUTOF10: 0
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
## 2024-10-06 NOTE — PROGRESS NOTES
10/06/24 1626   Patient Observation   Pulse 81   Respirations 20   SpO2 100 %   Vent Information   Vent Mode AC/PRVC   Ventilator Settings   FiO2  (S)  25 %   Insp Time (sec) 0.88 sec   Vt (Set, mL) 500 mL   Resp Rate (Set) 20 bpm   PEEP/CPAP (cmH2O) 5   Vent Patient Data (Readings)   Vt (Measured) 537 mL   Peak Inspiratory Pressure (cmH2O) 20 cmH2O   Rate Measured 20 br/min   Minute Volume (L/min) 10.7 Liters   Mean Airway Pressure (cmH2O) 9.6 cmH20   Inspiratory Time 0.88 sec   I:E Ratio 1:2.4   Flow Sensitivity 3 L/min   I Time/ I Time % 0.88 s   Backup Apnea On   Backup Rate 20 Breaths Per Minute   Backup Vt 500   Vent Alarm Settings   High Pressure (cmH2O) 40 cmH2O   Low Minute Volume (lpm) 2 L/min   High Minute Volume (lpm) 20 L/min   Low Exhaled Vt (ml) 200 mL   High Exhaled Vt (ml) 1000 mL   RR High (bpm) 40 br/min   Apnea (secs) 20 secs   Additional Respiratoray Assessments   Humidification Source HME   Circuit Condensation Drained   Ambu Bag With Mask At Bedside Yes   Airway Clearance   Suction ET Tube   Subglottic Suction Done No   Suction Device Inline suction catheter   Sputum Method Obtained Endotracheal   Sputum Amount Scant   Sputum Color/Odor Brown   Sputum Consistency Thick   ETT    Placement Date/Time: 10/05/24 1526   Present on Admission/Arrival: No  Placed By: In ED  Airway Type: Cuffed  Airway Tube Size: 8 mm  Location: Oral  Secured At: 25 cm  Measured From: Teeth   Secured At 25 cm   Measured From Lips   ETT Placement Right   Secured By Commercial tube [email or web address]   Site Assessment Dry   Cuff Pressure 28 cm H2O   Tie/Dhillon Changed No

## 2024-10-06 NOTE — PLAN OF CARE
Problem: Pain  Goal: Verbalizes/displays adequate comfort level or baseline comfort level  Outcome: Progressing  Flowsheets (Taken 10/5/2024 2036)  Verbalizes/displays adequate comfort level or baseline comfort level:   Encourage patient to monitor pain and request assistance   Assess pain using appropriate pain scale   Administer analgesics based on type and severity of pain and evaluate response   Implement non-pharmacological measures as appropriate and evaluate response   Consider cultural and social influences on pain and pain management     Problem: Discharge Planning  Goal: Discharge to home or other facility with appropriate resources  Outcome: Progressing  Flowsheets (Taken 10/5/2024 2000)  Discharge to home or other facility with appropriate resources: Arrange for needed discharge resources and transportation as appropriate     Problem: Safety - Adult  Goal: Free from fall injury  Outcome: Progressing     Problem: Respiratory - Adult  Goal: Achieves optimal ventilation and oxygenation  Outcome: Progressing     Problem: Cardiovascular - Adult  Goal: Maintains optimal cardiac output and hemodynamic stability  Outcome: Progressing  Goal: Absence of cardiac dysrhythmias or at baseline  Outcome: Progressing     Problem: Metabolic/Fluid and Electrolytes - Adult  Goal: Electrolytes maintained within normal limits  Outcome: Progressing  Goal: Hemodynamic stability and optimal renal function maintained  Outcome: Progressing  Goal: Glucose maintained within prescribed range  Outcome: Progressing

## 2024-10-06 NOTE — PLAN OF CARE
Problem: Pain  Goal: Verbalizes/displays adequate comfort level or baseline comfort level  10/6/2024 1923 by Cruz Omalley RN  Outcome: Progressing  10/6/2024 0629 by Marisol Glaser, RN  Outcome: Progressing  Flowsheets (Taken 10/6/2024 0316)  Verbalizes/displays adequate comfort level or baseline comfort level:   Assess pain using appropriate pain scale   Administer analgesics based on type and severity of pain and evaluate response   Consider cultural and social influences on pain and pain management   Notify Licensed Independent Practitioner if interventions unsuccessful or patient reports new pain     Problem: Discharge Planning  Goal: Discharge to home or other facility with appropriate resources  10/6/2024 1923 by Cruz Omalley RN  Outcome: Progressing  10/6/2024 0629 by Marisol Glaser RN  Outcome: Progressing     Problem: Safety - Adult  Goal: Free from fall injury  Outcome: Progressing     Problem: Neurosensory - Adult  Goal: Achieves stable or improved neurological status  Outcome: Progressing  Goal: Absence of seizures  Outcome: Progressing  Goal: Remains free of injury related to seizures activity  Outcome: Progressing  Goal: Achieves maximal functionality and self care  Outcome: Progressing     Problem: Respiratory - Adult  Goal: Achieves optimal ventilation and oxygenation  Outcome: Progressing     Problem: Cardiovascular - Adult  Goal: Maintains optimal cardiac output and hemodynamic stability  Outcome: Progressing  Goal: Absence of cardiac dysrhythmias or at baseline  Outcome: Progressing     Problem: Skin/Tissue Integrity - Adult  Goal: Skin integrity remains intact  Outcome: Progressing  Goal: Incisions, wounds, or drain sites healing without S/S of infection  Outcome: Progressing  Goal: Oral mucous membranes remain intact  10/6/2024 1923 by Cruz Omalley RN  Outcome: Progressing  10/6/2024 0629 by Marisol Glaser, RN  Outcome: Progressing  Flowsheets (Taken

## 2024-10-06 NOTE — PROGRESS NOTES
10/06/24 1944   Patient Observation   Pulse 86   Respirations 20   SpO2 98 %   Breath Sounds   Right Upper Lobe Diminished;Clear   Right Middle Lobe Diminished;Clear   Right Lower Lobe Diminished;Clear   Left Upper Lobe Diminished;Clear   Left Lower Lobe Diminished;Clear   Vent Information   Vent Mode AC/PRVC   Ventilator Settings   FiO2  25 %   Insp Time (sec) 0.88 sec   Vt (Set, mL) 500 mL   Resp Rate (Set) 20 bpm   PEEP/CPAP (cmH2O) 5   Vent Patient Data (Readings)   Vt (Measured) 525 mL   Peak Inspiratory Pressure (cmH2O) 20 cmH2O   Rate Measured 20 br/min   Minute Volume (L/min) 10.8 Liters   Mean Airway Pressure (cmH2O) 9.6 cmH20   Inspiratory Time 0.88 sec   I:E Ratio 1:2.4   I Time/ I Time % 0.88 s   Vent Alarm Settings   High Pressure (cmH2O) 40 cmH2O   Low Minute Volume (lpm) 2 L/min   High Minute Volume (lpm) 20 L/min   Low Exhaled Vt (ml) 200 mL   RR High (bpm) 40 br/min   Apnea (secs) 20 secs   Additional Respiratoray Assessments   Humidification Source HME   Ambu Bag With Mask At Bedside Yes   Airway Clearance   Suction Deep;ET Tube   Subglottic Suction Done Yes   Suction Device Inline suction catheter   Sputum Method Obtained Endotracheal   Sputum Amount Scant   Sputum Color/Odor Clear;Brown   Sputum Consistency Thin;Thick   ETT    Placement Date/Time: 10/05/24 1526   Present on Admission/Arrival: No  Placed By: In ED  Airway Type: Cuffed  Airway Tube Size: 8 mm  Location: Oral  Secured At: 25 cm  Measured From: Teeth   Secured At 25 cm   Measured From Lips   ETT Placement Center   Secured By Commercial tube antonio   Site Assessment Dry   Cuff Pressure 30 cm H2O

## 2024-10-06 NOTE — PROGRESS NOTES
10/05/24 2007   Patient Observation   Pulse 75   Respirations 20   SpO2 100 %   Breath Sounds   Right Upper Lobe Clear   Right Middle Lobe Clear   Right Lower Lobe Clear   Left Upper Lobe Clear   Left Lower Lobe Clear   Vent Information   Vent Mode AC/PRVC   Ventilator Settings   FiO2  40 %   Insp Time (sec) 0.88 sec   Vt (Set, mL) 500 mL   Resp Rate (Set) 20 bpm   PEEP/CPAP (cmH2O) 5   Vent Patient Data (Readings)   Vt (Measured) 514 mL   Peak Inspiratory Pressure (cmH2O) 25 cmH2O   Rate Measured 20 br/min   Minute Volume (L/min) 10.7 Liters   Mean Airway Pressure (cmH2O) 11 cmH20   Inspiratory Time 0.88 sec   I:E Ratio 1:2.4   I Time/ I Time % 0.88 s   Vent Alarm Settings   High Pressure (cmH2O) 40 cmH2O   Low Minute Volume (lpm) 2 L/min   High Minute Volume (lpm) 20 L/min   Low Exhaled Vt (ml) 200 mL   RR High (bpm) 40 br/min   Apnea (secs) 20 secs   Additional Respiratoray Assessments   Humidification Source HME   Ambu Bag With Mask At Bedside Yes   Airway Clearance   Subglottic Suction Done No   ETT    Placement Date/Time: 10/05/24 1526   Present on Admission/Arrival: No  Placed By: In ED  Airway Type: Cuffed  Airway Tube Size: 8 mm  Location: Oral  Secured At: 25 cm  Measured From: Teeth   Secured At 24 cm   Measured From Lips   ETT Placement Left   Secured By Commercial tube antonio   Site Assessment Dry   Cuff Pressure   (MOV at this time, pt supine)
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
## 2024-10-06 NOTE — PROGRESS NOTES
Pt rec'd overnight after PCI on integrillin gtt and intubated/sedated.      CTS to consult for High risk PCI vs OHS.      Pt sedated, no vasoactive gtts.      Pt in b/l soft wrist restraint for safety.  PT awoke abruptly around MN and it was apparent that he needed airway protection for nonpurposeful movements.      R radial arterial site WDL, TR band removed per protocol overnight with no track ooze, bleed, and minimal bruising to site.     Pt has had MAJOR ectopy overnight with frequent PVCS and several runs of Vtach.  Pt maintains BP, etc.during these periods of ectopy.      Pt on minimal vent settings.       Devika Glaser MSN RN

## 2024-10-06 NOTE — PROGRESS NOTES
10/06/24 0353   Patient Observation   Pulse 75   Respirations 20   SpO2 100 %   Breath Sounds   Right Upper Lobe Clear   Right Middle Lobe Clear   Right Lower Lobe Clear   Left Upper Lobe Clear   Left Lower Lobe Clear   Vent Information   Vent Mode AC/VC   Ventilator Settings   FiO2  35 %   Insp Time (sec) 0.88 sec   Vt (Set, mL) 500 mL   Resp Rate (Set) 20 bpm   PEEP/CPAP (cmH2O) 5   Vent Patient Data (Readings)   Vt (Measured) 534 mL   Peak Inspiratory Pressure (cmH2O) 20 cmH2O   Rate Measured 20 br/min   Minute Volume (L/min) 10.7 Liters   Mean Airway Pressure (cmH2O) 9.6 cmH20   Inspiratory Time 0.88 sec   I:E Ratio 1:2.4   I Time/ I Time % 0.88 s   Vent Alarm Settings   High Pressure (cmH2O) 40 cmH2O   Low Minute Volume (lpm) 2 L/min   High Minute Volume (lpm) 20 L/min   Low Exhaled Vt (ml) 200 mL   RR High (bpm) 40 br/min   Apnea (secs) 20 secs   Additional Respiratoray Assessments   Humidification Source HME   Ambu Bag With Mask At Bedside Yes   Airway Clearance   Subglottic Suction Done No

## 2024-10-06 NOTE — PROGRESS NOTES
10/05/24 2326   Patient Observation   Pulse 77   Respirations (!) 0   SpO2 98 %   Breath Sounds   Right Upper Lobe Clear   Right Middle Lobe Clear   Right Lower Lobe Diminished   Left Upper Lobe Clear   Left Lower Lobe Clear   Vent Information   Vent Mode AC/PRVC   Ventilator Settings   FiO2  40 %   Insp Time (sec) 0.88 sec   Vt (Set, mL) 500 mL   Resp Rate (Set) 20 bpm   Vent Patient Data (Readings)   Vt (Measured) 536 mL   Peak Inspiratory Pressure (cmH2O) 19 cmH2O   Rate Measured 20 br/min   Minute Volume (L/min) 10.7 Liters   Mean Airway Pressure (cmH2O) 9.4 cmH20   Inspiratory Time 0.88 sec   I:E Ratio 1:2.4   I Time/ I Time % 0.88 s   Vent Alarm Settings   High Pressure (cmH2O) 40 cmH2O   Low Minute Volume (lpm) 2 L/min   High Minute Volume (lpm) 20 L/min   Low Exhaled Vt (ml) 200 mL   RR High (bpm) 40 br/min   Apnea (secs) 20 secs   Additional Respiratoray Assessments   Humidification Source HME   Ambu Bag With Mask At Bedside Yes   Airway Clearance   Subglottic Suction Done No   ETT    Placement Date/Time: 10/05/24 1526   Present on Admission/Arrival: No  Placed By: In ED  Airway Type: Cuffed  Airway Tube Size: 8 mm  Location: Oral  Secured At: 25 cm  Measured From: Teeth   Secured At 24 cm   Measured From Lips   ETT Placement Center   Secured By Commercial tube antonio   Site Assessment Dry   Cuff Pressure   (MOV)

## 2024-10-06 NOTE — PROGRESS NOTES
10/06/24 1202   Patient Observation   Pulse 83   Respirations 20   SpO2 100 %   Breath Sounds   Breath Sounds Bilateral Clear;Diminished   Vent Information   Equipment Changed HME   Vent Mode AC/PRVC   Ventilator Settings   FiO2  30 %   Insp Time (sec) 0.88 sec   Vt (Set, mL) 500 mL   Resp Rate (Set) 20 bpm   PEEP/CPAP (cmH2O) 5   Vent Patient Data (Readings)   Vt (Measured) 574 mL   Peak Inspiratory Pressure (cmH2O) 23 cmH2O   Rate Measured 20 br/min   Minute Volume (L/min) 10.5 Liters   Mean Airway Pressure (cmH2O) 11 cmH20   I:E Ratio 1:2.4   Flow Sensitivity 3 L/min   Backup Apnea On   Vent Alarm Settings   High Pressure (cmH2O) 40 cmH2O   Low Minute Volume (lpm) 2 L/min   High Minute Volume (lpm) 20 L/min   Low Exhaled Vt (ml) 200 mL   High Exhaled Vt (ml) 1000 mL   RR High (bpm) 40 br/min   Apnea (secs) 20 secs   Additional Respiratoray Assessments   Humidification Source HME   Circuit Condensation Drained   Ambu Bag With Mask At Bedside Yes   Airway Clearance   Suction ET Tube   Suction Device Inline suction catheter   Sputum Method Obtained Endotracheal   Sputum Amount Moderate   Sputum Color/Odor Brown   Sputum Consistency Thin;Thick   ETT    Placement Date/Time: 10/05/24 1526   Present on Admission/Arrival: No  Placed By: In ED  Airway Type: Cuffed  Airway Tube Size: 8 mm  Location: Oral  Secured At: 25 cm  Measured From: Teeth   Secured At 25 cm   Measured From Lips   ETT Placement Center   Secured By Commercial tube antonio   Site Assessment Dry

## 2024-10-06 NOTE — PLAN OF CARE
Problem: Pain  Goal: Verbalizes/displays adequate comfort level or baseline comfort level  10/6/2024 0629 by Marisol Glaser RN  Outcome: Progressing  Flowsheets (Taken 10/6/2024 0316)  Verbalizes/displays adequate comfort level or baseline comfort level:   Assess pain using appropriate pain scale   Administer analgesics based on type and severity of pain and evaluate response   Consider cultural and social influences on pain and pain management   Notify Licensed Independent Practitioner if interventions unsuccessful or patient reports new pain  10/5/2024 2057 by Marisol Glaser RN  Outcome: Progressing  Flowsheets (Taken 10/5/2024 2036)  Verbalizes/displays adequate comfort level or baseline comfort level:   Encourage patient to monitor pain and request assistance   Assess pain using appropriate pain scale   Administer analgesics based on type and severity of pain and evaluate response   Implement non-pharmacological measures as appropriate and evaluate response   Consider cultural and social influences on pain and pain management     Problem: Discharge Planning  Goal: Discharge to home or other facility with appropriate resources  10/6/2024 0629 by Marisol Glaser RN  Outcome: Progressing  10/5/2024 2057 by Marisol Glaser RN  Outcome: Progressing  Flowsheets (Taken 10/5/2024 2000)  Discharge to home or other facility with appropriate resources: Arrange for needed discharge resources and transportation as appropriate     Problem: Safety - Adult  Goal: Free from fall injury  Recent Flowsheet Documentation  Taken 10/6/2024 0158 by Marsiol Glaser RN  Free From Fall Injury:   Instruct family/caregiver on patient safety   Based on caregiver fall risk screen, instruct family/caregiver to ask for assistance with transferring infant if caregiver noted to have fall risk factors  10/5/2024 2057 by Marisol Glaser, RN  Outcome: Progressing     Problem:

## 2024-10-06 NOTE — PROGRESS NOTES
[facility]   Progress Note  Cardiology    CC:  MI, Arrest    HPI:  Sedated on vent. Lessening NSVT today.       Medications/Labs all Reviewed    Lab Results   Component Value Date    WBC 10.5 10/06/2024    HGB 12.8 (L) 10/06/2024    HCT 38.0 (L) 10/06/2024    MCV 94.4 10/06/2024     10/06/2024     Lab Results   Component Value Date    CREATININE 1.0 10/06/2024    BUN 12 10/06/2024     10/06/2024    K 4.1 10/06/2024     10/06/2024    CO2 23 10/06/2024     No results found for: \"INR\", \"PROTIME\"     PHYSICAL EXAM   /61   Pulse 83   Temp 98.8 °F (37.1 °C) (Bladder)   Resp 20   Ht 1.829 m (6')   Wt 83.9 kg (185 lb)   SpO2 100%   BMI 25.09 kg/m²      Respiratory:  Resp Assessment: Normal respiratory effort  Resp Auscultation: Clear to auscultation bilaterally   Cardiovascular:  Auscultation: regular rate and rhythm, normal S1S2, no murmur, rub or gallop  Palpation:  Nl PMI  JVP:  normal  Extremities: No Edema  Abdomen:  Soft, non-tender  Normal bowel sounds  Extremities:   No Cyanosis or Clubbing  Neurological/Psychiatric:  Oriented to time, place, and person  Non-anxious  Skin:   Warm and dry    TELE; (tracings personally reviewed)  Sinus, NSVT    ECHO 10/5/2024    Left Ventricle: Mildly reduced left ventricular systolic function with   a visually estimated EF of 40 - 45%. EF by 2D Simpsons Biplane is 51%.   Left ventricle size is normal. Normal wall thickness. Normal diastolic   function.    Aortic Valve: Trileaflet valve. Mildly calcified cusps.     Tricuspid Valve: Mildly elevated RVSP, consistent with mild pulmonary   hypertension. The estimated RVSP is 43 mmHg.     Image quality is adequate. Contrast used: Definity.      LABS  WBC10.5K/uL RBC4.02 Low M/uL Ogrdebtdfu06.8 Low g/dL Snoaoulbxy58.0 Low % MCV94.4fL MCH31.9pg MCHC33.8g/dL RDW13.3% Qqpjqagjj694Y/uL    Total Protein5.5 Low g/dL Albumin3.3 Low g/dL Alkaline Toadfzqhtwv55L/L XOM820 High U/L [id number] High U/L Total

## 2024-10-06 NOTE — PROGRESS NOTES
10/06/24 0817   Patient Observation   Pulse 82   Respirations 18   SpO2 99 %   Breath Sounds   Right Upper Lobe Rhonchi   Right Middle Lobe Rhonchi   Right Lower Lobe Rhonchi   Left Upper Lobe Clear   Left Lower Lobe Rhonchi   Vent Information   Equipment Changed HME   Vent Mode AC/PRVC   Ventilator Settings   FiO2  30 %   Insp Time (sec) 0.88 sec   Vt (Set, mL) 500 mL   Resp Rate (Set) 2 bpm   PEEP/CPAP (cmH2O) 5   Vent Patient Data (Readings)   Vt (Measured) 531 mL   Peak Inspiratory Pressure (cmH2O) 21 cmH2O   Rate Measured 20 br/min   Minute Volume (L/min) 10.5 Liters   Mean Airway Pressure (cmH2O) 9.7 cmH20   Inspiratory Time 0.88 sec   I:E Ratio 1:2.4   Flow Sensitivity 3 L/min   Backup Apnea On   Vent Alarm Settings   High Pressure (cmH2O) 40 cmH2O   Low Minute Volume (lpm) 2 L/min   High Minute Volume (lpm) 20 L/min   Low Exhaled Vt (ml) 200 mL   High Exhaled Vt (ml) 1000 mL   RR High (bpm) 40 br/min   Apnea (secs) 20 secs   Additional Respiratoray Assessments   Humidification Source HME   Circuit Condensation Drained   Ambu Bag With Mask At Bedside Yes   Airway Clearance   Suction Oral;ET Tube  (nurse sxn upon entering room)   Sputum Amount Small   Sputum Color/Odor (S)  Brown;Bloody   $Obtained Sample $Induced Sputum (charge not used for Bronchoscopy)   ETT    Placement Date/Time: 10/05/24 1526   Present on Admission/Arrival: No  Placed By: In ED  Airway Type: Cuffed  Airway Tube Size: 8 mm  Location: Oral  Secured At: 25 cm  Measured From: Teeth   Secured At 25 cm   Measured From Lips   ETT Placement Left   Secured By Commercial tube antonio   Site Assessment Dry

## 2024-10-07 LAB
ALBUMIN SERPL-MCNC: 2.6 G/DL (ref 3.4–5)
ALBUMIN/GLOB SERPL: 1.2 {RATIO} (ref 1.1–2.2)
ALP SERPL-CCNC: 64 U/L (ref 40–129)
ALT SERPL-CCNC: 239 U/L (ref 10–40)
ANION GAP SERPL CALCULATED.3IONS-SCNC: 10 MMOL/L (ref 3–16)
ANION GAP SERPL CALCULATED.3IONS-SCNC: 12 MMOL/L (ref 3–16)
AST SERPL-CCNC: 165 U/L (ref 15–37)
BASE EXCESS BLDA CALC-SCNC: 1 MMOL/L (ref -3–3)
BASE EXCESS BLDA CALC-SCNC: 1 MMOL/L (ref -3–3)
BASOPHILS # BLD: 0 K/UL (ref 0–0.2)
BASOPHILS NFR BLD: 0.3 %
BILIRUB SERPL-MCNC: 0.7 MG/DL (ref 0–1)
BUN SERPL-MCNC: 14 MG/DL (ref 7–20)
BUN SERPL-MCNC: 14 MG/DL (ref 7–20)
CA-I BLD-SCNC: 0.99 MMOL/L (ref 1.12–1.32)
CALCIUM SERPL-MCNC: 7.5 MG/DL (ref 8.3–10.6)
CALCIUM SERPL-MCNC: 7.7 MG/DL (ref 8.3–10.6)
CHLORIDE SERPL-SCNC: 105 MMOL/L (ref 99–110)
CHLORIDE SERPL-SCNC: 109 MMOL/L (ref 99–110)
CO2 BLDA-SCNC: 25 MMOL/L
CO2 BLDA-SCNC: 26 MMOL/L
CO2 SERPL-SCNC: 19 MMOL/L (ref 21–32)
CO2 SERPL-SCNC: 23 MMOL/L (ref 21–32)
CREAT SERPL-MCNC: 1.1 MG/DL (ref 0.8–1.3)
CREAT SERPL-MCNC: 1.1 MG/DL (ref 0.8–1.3)
DEPRECATED RDW RBC AUTO: 13.5 % (ref 12.4–15.4)
EOSINOPHIL # BLD: 0 K/UL (ref 0–0.6)
EOSINOPHIL NFR BLD: 0.5 %
GFR SERPLBLD CREATININE-BSD FMLA CKD-EPI: 74 ML/MIN/{1.73_M2}
GFR SERPLBLD CREATININE-BSD FMLA CKD-EPI: 74 ML/MIN/{1.73_M2}
GLUCOSE BLD-MCNC: 102 MG/DL (ref 70–99)
GLUCOSE BLD-MCNC: 108 MG/DL (ref 70–99)
GLUCOSE SERPL-MCNC: 109 MG/DL (ref 70–99)
GLUCOSE SERPL-MCNC: 116 MG/DL (ref 70–99)
HCO3 BLDA-SCNC: 24 MMOL/L (ref 21–29)
HCO3 BLDA-SCNC: 25.2 MMOL/L (ref 21–29)
HCT VFR BLD AUTO: 33.2 % (ref 40.5–52.5)
HGB BLD-MCNC: 11.3 G/DL (ref 13.5–17.5)
INR PPP: 1.2 (ref 0.85–1.15)
LACTATE BLDV-SCNC: 1.3 MMOL/L (ref 0.4–2)
LYMPHOCYTES # BLD: 0.9 K/UL (ref 1–5.1)
LYMPHOCYTES NFR BLD: 9.3 %
MAGNESIUM SERPL-MCNC: 1.8 MG/DL (ref 1.8–2.4)
MCH RBC QN AUTO: 32 PG (ref 26–34)
MCHC RBC AUTO-ENTMCNC: 34 G/DL (ref 31–36)
MCV RBC AUTO: 94.1 FL (ref 80–100)
MONOCYTES # BLD: 1 K/UL (ref 0–1.3)
MONOCYTES NFR BLD: 9.8 %
NEUTROPHILS # BLD: 8 K/UL (ref 1.7–7.7)
NEUTROPHILS NFR BLD: 80.1 %
PCO2 BLDA: 31.5 MM HG (ref 35–45)
PCO2 BLDA: 38.8 MM HG (ref 35–45)
PERFORMED ON: ABNORMAL
PERFORMED ON: ABNORMAL
PH BLDA: 7.42 [PH] (ref 7.35–7.45)
PH BLDA: 7.49 [PH] (ref 7.35–7.45)
PH BLDV: 7.48 [PH] (ref 7.35–7.45)
PLATELET # BLD AUTO: 158 K/UL (ref 135–450)
PMV BLD AUTO: 7.6 FL (ref 5–10.5)
PO2 BLDA: 67.1 MM HG (ref 75–108)
PO2 BLDA: 86.5 MM HG (ref 75–108)
POC ACT LR: >400 SEC
POC ACT LR: >400 SEC
POC SAMPLE TYPE: ABNORMAL
POC SAMPLE TYPE: ABNORMAL
POTASSIUM SERPL-SCNC: 3.6 MMOL/L (ref 3.5–5.1)
POTASSIUM SERPL-SCNC: 3.8 MMOL/L (ref 3.5–5.1)
PROT SERPL-MCNC: 4.8 G/DL (ref 6.4–8.2)
PROTHROMBIN TIME: 15.4 SEC (ref 11.9–14.9)
RBC # BLD AUTO: 3.53 M/UL (ref 4.2–5.9)
SAO2 % BLDA: 93 % (ref 93–100)
SAO2 % BLDA: 98 % (ref 93–100)
SODIUM SERPL-SCNC: 138 MMOL/L (ref 136–145)
SODIUM SERPL-SCNC: 140 MMOL/L (ref 136–145)
TRIGL SERPL-MCNC: 158 MG/DL (ref 0–150)
TROPONIN, HIGH SENSITIVITY: 1926 NG/L (ref 0–22)
WBC # BLD AUTO: 10 K/UL (ref 4–11)

## 2024-10-07 PROCEDURE — 83735 ASSAY OF MAGNESIUM: CPT

## 2024-10-07 PROCEDURE — 94640 AIRWAY INHALATION TREATMENT: CPT

## 2024-10-07 PROCEDURE — 2700000000 HC OXYGEN THERAPY PER DAY

## 2024-10-07 PROCEDURE — 6370000000 HC RX 637 (ALT 250 FOR IP): Performed by: NURSE PRACTITIONER

## 2024-10-07 PROCEDURE — 99223 1ST HOSP IP/OBS HIGH 75: CPT | Performed by: STUDENT IN AN ORGANIZED HEALTH CARE EDUCATION/TRAINING PROGRAM

## 2024-10-07 PROCEDURE — 6370000000 HC RX 637 (ALT 250 FOR IP): Performed by: INTERNAL MEDICINE

## 2024-10-07 PROCEDURE — 94761 N-INVAS EAR/PLS OXIMETRY MLT: CPT

## 2024-10-07 PROCEDURE — 85025 COMPLETE CBC W/AUTO DIFF WBC: CPT

## 2024-10-07 PROCEDURE — 2500000003 HC RX 250 WO HCPCS: Performed by: INTERNAL MEDICINE

## 2024-10-07 PROCEDURE — 2580000003 HC RX 258: Performed by: INTERNAL MEDICINE

## 2024-10-07 PROCEDURE — 99291 CRITICAL CARE FIRST HOUR: CPT | Performed by: INTERNAL MEDICINE

## 2024-10-07 PROCEDURE — 6360000002 HC RX W HCPCS: Performed by: STUDENT IN AN ORGANIZED HEALTH CARE EDUCATION/TRAINING PROGRAM

## 2024-10-07 PROCEDURE — 6360000002 HC RX W HCPCS: Performed by: INTERNAL MEDICINE

## 2024-10-07 PROCEDURE — 2000000000 HC ICU R&B

## 2024-10-07 PROCEDURE — 2500000003 HC RX 250 WO HCPCS: Performed by: NURSE PRACTITIONER

## 2024-10-07 PROCEDURE — 82947 ASSAY GLUCOSE BLOOD QUANT: CPT

## 2024-10-07 PROCEDURE — 84484 ASSAY OF TROPONIN QUANT: CPT

## 2024-10-07 PROCEDURE — 84478 ASSAY OF TRIGLYCERIDES: CPT

## 2024-10-07 PROCEDURE — 83605 ASSAY OF LACTIC ACID: CPT

## 2024-10-07 PROCEDURE — 82803 BLOOD GASES ANY COMBINATION: CPT

## 2024-10-07 PROCEDURE — 82330 ASSAY OF CALCIUM: CPT

## 2024-10-07 PROCEDURE — 2580000003 HC RX 258: Performed by: STUDENT IN AN ORGANIZED HEALTH CARE EDUCATION/TRAINING PROGRAM

## 2024-10-07 PROCEDURE — 85610 PROTHROMBIN TIME: CPT

## 2024-10-07 PROCEDURE — 80053 COMPREHEN METABOLIC PANEL: CPT

## 2024-10-07 RX ORDER — SENNOSIDES A AND B 8.6 MG/1
1 TABLET, FILM COATED ORAL 2 TIMES DAILY
Status: DISCONTINUED | OUTPATIENT
Start: 2024-10-07 | End: 2024-10-10 | Stop reason: HOSPADM

## 2024-10-07 RX ORDER — FENTANYL CITRATE-0.9 % NACL/PF 10 MCG/ML
25-200 PLASTIC BAG, INJECTION (ML) INTRAVENOUS CONTINUOUS
Status: DISCONTINUED | OUTPATIENT
Start: 2024-10-07 | End: 2024-10-07

## 2024-10-07 RX ORDER — MINERAL OIL AND WHITE PETROLATUM 150; 830 MG/G; MG/G
OINTMENT OPHTHALMIC
Status: DISCONTINUED | OUTPATIENT
Start: 2024-10-07 | End: 2024-10-08

## 2024-10-07 RX ORDER — IPRATROPIUM BROMIDE AND ALBUTEROL SULFATE 2.5; .5 MG/3ML; MG/3ML
1 SOLUTION RESPIRATORY (INHALATION)
Status: DISCONTINUED | OUTPATIENT
Start: 2024-10-07 | End: 2024-10-09

## 2024-10-07 RX ORDER — CHLORHEXIDINE GLUCONATE ORAL RINSE 1.2 MG/ML
15 SOLUTION DENTAL 2 TIMES DAILY
Status: DISCONTINUED | OUTPATIENT
Start: 2024-10-07 | End: 2024-10-10 | Stop reason: HOSPADM

## 2024-10-07 RX ORDER — BISACODYL 10 MG
10 SUPPOSITORY, RECTAL RECTAL ONCE
Status: DISCONTINUED | OUTPATIENT
Start: 2024-10-07 | End: 2024-10-10 | Stop reason: HOSPADM

## 2024-10-07 RX ORDER — MUPIROCIN 20 MG/G
OINTMENT TOPICAL 2 TIMES DAILY
Status: DISCONTINUED | OUTPATIENT
Start: 2024-10-07 | End: 2024-10-09 | Stop reason: HOSPADM

## 2024-10-07 RX ORDER — DEXMEDETOMIDINE HYDROCHLORIDE 4 UG/ML
.1-.6 INJECTION, SOLUTION INTRAVENOUS CONTINUOUS
Status: DISCONTINUED | OUTPATIENT
Start: 2024-10-07 | End: 2024-10-08

## 2024-10-07 RX ADMIN — PANTOPRAZOLE SODIUM 40 MG: 40 INJECTION, POWDER, FOR SOLUTION INTRAVENOUS at 08:48

## 2024-10-07 RX ADMIN — SODIUM CHLORIDE, PRESERVATIVE FREE 10 ML: 5 INJECTION INTRAVENOUS at 21:57

## 2024-10-07 RX ADMIN — PROPOFOL 30 MCG/KG/MIN: 10 INJECTION, EMULSION INTRAVENOUS at 04:14

## 2024-10-07 RX ADMIN — METOPROLOL TARTRATE 25 MG: 25 TABLET, FILM COATED ORAL at 21:57

## 2024-10-07 RX ADMIN — ASPIRIN 81 MG 81 MG: 81 TABLET ORAL at 08:47

## 2024-10-07 RX ADMIN — TICAGRELOR 90 MG: 90 TABLET ORAL at 08:48

## 2024-10-07 RX ADMIN — IPRATROPIUM BROMIDE AND ALBUTEROL SULFATE 1 DOSE: 2.5; .5 SOLUTION RESPIRATORY (INHALATION) at 19:57

## 2024-10-07 RX ADMIN — AMPICILLIN SODIUM AND SULBACTAM SODIUM 3000 MG: 2; 1 INJECTION, POWDER, FOR SOLUTION INTRAMUSCULAR; INTRAVENOUS at 01:15

## 2024-10-07 RX ADMIN — DOCUSATE SODIUM LIQUID 100 MG: 100 LIQUID ORAL at 11:32

## 2024-10-07 RX ADMIN — SODIUM CHLORIDE, PRESERVATIVE FREE 10 ML: 5 INJECTION INTRAVENOUS at 08:49

## 2024-10-07 RX ADMIN — AMPICILLIN SODIUM AND SULBACTAM SODIUM 3000 MG: 2; 1 INJECTION, POWDER, FOR SOLUTION INTRAMUSCULAR; INTRAVENOUS at 06:44

## 2024-10-07 RX ADMIN — AMPICILLIN SODIUM AND SULBACTAM SODIUM 3000 MG: 2; 1 INJECTION, POWDER, FOR SOLUTION INTRAMUSCULAR; INTRAVENOUS at 12:14

## 2024-10-07 RX ADMIN — Medication 0.4 MCG/KG/HR: at 14:36

## 2024-10-07 RX ADMIN — ATORVASTATIN CALCIUM 80 MG: 80 TABLET, FILM COATED ORAL at 21:57

## 2024-10-07 RX ADMIN — SENNOSIDES 8.6 MG: 8.6 TABLET, FILM COATED ORAL at 11:31

## 2024-10-07 RX ADMIN — TICAGRELOR 90 MG: 90 TABLET ORAL at 21:57

## 2024-10-07 RX ADMIN — METOPROLOL TARTRATE 25 MG: 25 TABLET, FILM COATED ORAL at 08:47

## 2024-10-07 RX ADMIN — PROPOFOL 20 MCG/KG/MIN: 10 INJECTION, EMULSION INTRAVENOUS at 11:47

## 2024-10-07 RX ADMIN — ENOXAPARIN SODIUM 40 MG: 100 INJECTION SUBCUTANEOUS at 08:48

## 2024-10-07 RX ADMIN — SODIUM CHLORIDE 5 MCG/MIN: 9 INJECTION, SOLUTION INTRAVENOUS at 01:03

## 2024-10-07 RX ADMIN — DOCUSATE SODIUM LIQUID 100 MG: 100 LIQUID ORAL at 21:57

## 2024-10-07 RX ADMIN — MINERAL OIL, PETROLATUM: 425; 568 OINTMENT OPHTHALMIC at 09:19

## 2024-10-07 RX ADMIN — MUPIROCIN: 20 OINTMENT TOPICAL at 08:48

## 2024-10-07 RX ADMIN — CHLORHEXIDINE GLUCONATE 15 ML: 1.2 RINSE ORAL at 21:57

## 2024-10-07 RX ADMIN — CALCIUM GLUCONATE 2000 MG: 98 INJECTION, SOLUTION INTRAVENOUS at 05:08

## 2024-10-07 RX ADMIN — CHLORHEXIDINE GLUCONATE 15 ML: 1.2 RINSE ORAL at 08:48

## 2024-10-07 RX ADMIN — SENNOSIDES 8.6 MG: 8.6 TABLET, FILM COATED ORAL at 21:57

## 2024-10-07 RX ADMIN — Medication 100 MCG/HR: at 08:00

## 2024-10-07 ASSESSMENT — PAIN SCALES - GENERAL
PAINLEVEL_OUTOF10: 0
PAINLEVEL_OUTOF10: 0
PAINLEVEL_OUTOF10: 5

## 2024-10-07 ASSESSMENT — PULMONARY FUNCTION TESTS
PIF_VALUE: 20
PIF_VALUE: 19
PIF_VALUE: 21

## 2024-10-07 ASSESSMENT — PAIN DESCRIPTION - LOCATION: LOCATION: THROAT

## 2024-10-07 NOTE — PROGRESS NOTES
Pt given soap and water bed bath.   Mouth care.   Temp is 101, exhibited major shiver reaction to water evaporation during bath.   Bath completed and covered pt in warm blanket.      Suctioned some bloody secretion and lavage via ETT per RT.   Pt required increase to propofol for vent synchrony and agitation.  Pt does not follow commands, but he does seem to have purposeful movements to grab toward the ET tube.     Will attempt SAT in the am, but pt appears to be very anxious and fearful when he has moments of waking.      Devika Glaser RN

## 2024-10-07 NOTE — PROGRESS NOTES
Vent was alarming, came to see pt and found pt almost up in bed reaching for ET tube, peak pressure alarming, sxn pt 3x, moderate brown thin sxns, RN in room to aid with calming pt, meds adjusted at this time

## 2024-10-07 NOTE — PROGRESS NOTES
[facility] Medicine Progress Note  V10/1      Date of Admission: 10/5/2024  Hospital Day: 3    Chief Admission Complaint:  \"cardiac arrest\"    Subjective:  no new c/o    Presenting Admission History:         \"68 y.o. male who presented to [facility] with cardiac arrest.  PMHx significant for NA.       Patient intubated and sedated.  No family at bedside.  Informed by nursing staff that patient was riding his bike and felt a weird feeling prompting him to call 911.  Patient left before EMS arrived.  Unknown time of patient being unconscious.  ROSC achieved prior to ED.  While in the ED patient arrested requiring 1 shock.  Patient was intubated.  Found to have STEMI on ECG.  Patient was taken to cath for stent x 1.     Patient presented to the emergency department via EMS with a temperature of 94.5 Fahrenheit.  Respiratory rate 21 satting at 100% on room air.  Heart rate 132.  /83.  Potassium 1.9.  CO2 12.  Magnesium 1.1.  Lactic acid 5.4.  Calcium 4.2.  Albumin 1.9.  , AST 99.  Glucose 157.  Hemoglobin 10.0, MCV 94.0.  ABG showed pH of 7.251, pCO2 47.5, pO2 80.5, HCO3 20.9.  CT head without contrast unremarkable.  CT chest abdomen pelvis showed possible pneumonia but could possibly be aspiration, no acute abnormality in abdomen or pelvis.  ECG showed sinus tachycardia with a rate of 116, STEMI.  Patient was started on heparin and brought to the Cath Lab for further treatment of STEMI.  After which patient was brought to the ICU and is currently stable at this time\".       Assessment/Plan:      Current Principal Problem:  Cardiac arrest      STEMI w/ Cardiac arrest.  S/P ACLS w/ ROSC.  Cardiology consulted and appreciated - taken emergently to the Cath Lab 5 October s/p CORI to Cx w/out complications.  Consultant notes from yesterday and today  were reviewed, w/ plan for continued need for inpatient w/up and management - on ASA/Brilinta.  BBlocker/ACEi as tolerated. CT Surgery consulted and appreciated  propofol 30 mcg/kg/min (10/07/24 0703)    midazolam Stopped (10/06/24 1548)    sodium chloride      norepinephrine 2 mcg/min (10/07/24 0703)     Scheduled Medications    mupirocin   Each Nostril BID    chlorhexidine  15 mL Mouth/Throat BID    artificial tears   Both Eyes 6 times per day    metoprolol tartrate  25 mg Oral BID    sodium chloride flush  5-40 mL IntraVENous 2 times per day    enoxaparin  40 mg SubCUTAneous Daily    ampicillin-sulbactam  3,000 mg IntraVENous Q6H    pantoprazole  40 mg IntraVENous Daily    aspirin  81 mg Oral Daily    ticagrelor  90 mg Oral BID    atorvastatin  80 mg Oral Nightly     PRN Meds: sodium chloride flush, sodium chloride, potassium chloride **OR** potassium alternative oral replacement **OR** potassium chloride, magnesium sulfate, ondansetron **OR** ondansetron, polyethylene glycol, acetaminophen **OR** acetaminophen     Labs:  Personally reviewed on 10/7/2024 and interpreted for clinical significance.     Recent Labs     10/05/24  1900 10/06/24  0555 10/07/24  0115   WBC 14.7* 10.5 10.0   HGB 13.7 12.8* 11.3*   HCT 40.3* 38.0* 33.2*    170 158     Recent Labs     10/05/24  1513 10/05/24  1648 10/06/24  0050 10/06/24  0555 10/07/24  0115      < > 140 139 138   K 1.9*   < > 4.3 4.1 3.6   *   < > 107 107 105   CO2 12*   < > 24 23 23   BUN 8   < > 12 12 14   CREATININE <0.5*   < > 1.1 1.0 1.1   CALCIUM 4.2*   < > 8.4 8.3 7.5*   MG 1.10*  --   --  2.00  --     < > = values in this interval not displayed.     Recent Labs     10/05/24  1513 10/06/24  0555 10/06/24  1230 10/06/24  1743 10/07/24  0123   PROBNP 61  --   --   --   --    TROPHS <6   < > 2,549* 2,236* 1,926*    < > = values in this interval not displayed.     No results for input(s): \"LABA1C\" in the last 72 hours.  Recent Labs     10/06/24  0050 10/06/24  0555 10/07/24  0115   * 299* 165*   * 366* 239*   BILIDIR  --  0.2  --    BILITOT 0.8 0.9 0.7   ALKPHOS 73 75 64     Recent Labs

## 2024-10-07 NOTE — PROGRESS NOTES
All personal items were given to patient's sister (Milly) including wallet, keys, phone, glasses, clothes, and shoes.

## 2024-10-07 NOTE — PLAN OF CARE
Problem: Pain  Goal: Verbalizes/displays adequate comfort level or baseline comfort level  10/7/2024 0633 by Marisol Glaser RN  Outcome: Progressing  Flowsheets (Taken 10/6/2024 1944)  Verbalizes/displays adequate comfort level or baseline comfort level:   Assess pain using appropriate pain scale   Administer analgesics based on type and severity of pain and evaluate response   Consider cultural and social influences on pain and pain management   Implement non-pharmacological measures as appropriate and evaluate response  10/6/2024 1923 by Cruz Omalley RN  Outcome: Progressing     Problem: Safety - Adult  Goal: Free from fall injury  10/7/2024 0633 by Marisol Glaser RN  Outcome: Progressing  Flowsheets (Taken 10/6/2024 2356)  Free From Fall Injury: Instruct family/caregiver on patient safety  10/6/2024 1923 by Cruz Omalley RN  Outcome: Progressing     Problem: Neurosensory - Adult  Goal: Achieves stable or improved neurological status  10/7/2024 0633 by Marisol Glaser RN  Outcome: Progressing  10/6/2024 1923 by Cruz Omalley RN  Outcome: Progressing  Goal: Absence of seizures  10/7/2024 0633 by Marisol Glaser RN  Outcome: Progressing  10/6/2024 1923 by Cruz Omalley RN  Outcome: Progressing  Goal: Remains free of injury related to seizures activity  10/6/2024 1923 by Cruz Omalley RN  Outcome: Progressing  Goal: Achieves maximal functionality and self care  10/6/2024 1923 by Curz Omalley RN  Outcome: Progressing

## 2024-10-07 NOTE — PROGRESS NOTES
Pt has had less ectopy overnight   Pt failed SAT r/t agitation and anxiety   Remains restrained   Calcium gluconate restored   Shivering   Tmax 102       Plan is for SBT today.      Jailyn

## 2024-10-07 NOTE — CARE COORDINATION
Case Management Assessment  Initial Evaluation    Date/Time of Evaluation: 10/7/2024 12:17 PM  Assessment Completed by: Vanessa Hallman RN    If patient is discharged prior to next notation, then this note serves as note for discharge by case management.    Patient Name: Chano Clay                   YOB: 1955  Diagnosis: Cardiac arrest [I46.9]  STEMI (ST elevation myocardial infarction) (HCC) [I21.3]  ST elevation myocardial infarction (STEMI), unspecified artery (HCC) [I21.3]                   Date / Time: 10/5/2024  3:06 PM    Patient Admission Status: Inpatient   Readmission Risk (Low < 19, Mod (19-27), High > 27): Readmission Risk Score: 13.1    Current PCP: Jh Lopez MD  PCP verified by CM? Yes (Jh Lopez)    Chart Reviewed: Yes      History Provided by: Child/Family, Medical Record  Patient Orientation: Unable to Assess (Pateint currently intubated)    Patient Cognition: Other (see comment) (Per family no issues)    Hospitalization in the last 30 days (Readmission):  No    If yes, Readmission Assessment in  Navigator will be completed.    Advance Directives:      Code Status: Full Code   Patient's Primary Decision Maker is:  (Unable to acertain d/t being on Vent)      Discharge Planning:    Patient lives with: Children Type of Home: House  Primary Care Giver: Self  Patient Support Systems include: Children, Family Members   Current Financial resources: Other (Comment) (Insurance cards not in possession at this time)  Current community resources: None  Current services prior to admission: None            Current DME:              Type of Home Care services:  None    ADLS  Prior functional level: Independent in ADLs/IADLs  Current functional level: Other (see comment) (Currently intubated and all needs met by staff)    PT AM-PAC:   /24  OT AM-PAC:   /24    Family can provide assistance at DC: Yes  Would you like Case Management to discuss the discharge plan with  any other family members/significant others, and if so, who?    Plans to Return to Present Housing: Unknown at present  Other Identified Issues/Barriers to RETURNING to current housing: TBD  Potential Assistance needed at discharge: N/A            Potential DME:    Patient expects to discharge to: House  Plan for transportation at discharge: Family    Financial    Payor: MEDICARE / Plan: MEDICARE PART A AND B / Product Type: *No Product type* /     Does insurance require precert for SNF: No    Potential assistance Purchasing Medications: No  Meds-to-Beds request: Yes    No Pharmacies Listed    Notes:    Factors facilitating achievement of predicted outcomes: Family support    Barriers to discharge: Medical complications and Patient currently intubated and unable to communicate    Additional Case Management Notes: IPTA; Lives in 3 story Condo with family. Patient I currently intubated. Son Danny is NOK but has given permission to share any updates with patient's sister Milly. Will follow for discharge needs.    The Plan for Transition of Care is related to the following treatment goals of Cardiac arrest [I46.9]  STEMI (ST elevation myocardial infarction) (HCC) [I21.3]  ST elevation myocardial infarction (STEMI), unspecified artery (HCC) [I21.3]    IF APPLICABLE: The Patient and/or patient representative Chano and his family were provided with a choice of provider and agrees with the discharge plan. Freedom of choice list with basic dialogue that supports the patient's individualized plan of care/goals and shares the quality data associated with the providers was provided to:     Patient Representative Name:       The Patient and/or Patient Representative Agree with the Discharge Plan?      Vanessa Hallman RN  Case Management Department  Ph: [phone]

## 2024-10-07 NOTE — PROGRESS NOTES
10/07/24 0419   Patient Observation   Pulse 82   Respirations 20   SpO2 100 %   Breath Sounds   Respiratory Pattern Regular   Right Upper Lobe Clear   Right Middle Lobe Clear   Right Lower Lobe Clear   Left Upper Lobe Clear   Left Lower Lobe Clear   Vent Information   Equipment Changed HME   Vent Mode AC/PRVC   Ventilator Settings   FiO2  40 %   Insp Time (sec) 0.88 sec   Vt (Set, mL) 500 mL   Resp Rate (Set) 20 bpm   PEEP/CPAP (cmH2O) 5   Vent Patient Data (Readings)   Vt (Measured) 537 mL   Peak Inspiratory Pressure (cmH2O) 21 cmH2O   Rate Measured 20 br/min   Minute Volume (L/min) 10.8 Liters   Mean Airway Pressure (cmH2O) 9.6 cmH20   Inspiratory Time 0.88 sec   I:E Ratio 1:2.4   I Time/ I Time % 0.88 s   Vent Alarm Settings   High Pressure (cmH2O) 40 cmH2O   Low Minute Volume (lpm) 2 L/min   High Minute Volume (lpm) 24 L/min   Low Exhaled Vt (ml) 200 mL   RR High (bpm) 40 br/min   Apnea (secs) 20 secs   Additional Respiratoray Assessments   Humidification Source HME   Ambu Bag With Mask At Bedside Yes   Airway Clearance   Subglottic Suction Done No   ETT    Placement Date/Time: 10/05/24 1526   Present on Admission/Arrival: No  Placed By: In ED  Airway Type: Cuffed  Airway Tube Size: 8 mm  Location: Oral  Secured At: 25 cm  Measured From: Teeth   Secured At 25 cm   Measured From Lips   ETT Placement Left   Secured By Commercial tube antonio   Site Assessment Dry

## 2024-10-07 NOTE — PROGRESS NOTES
10/06/24 2346   Patient Observation   Pulse (!) 115   Respirations 25   SpO2 95 %   Vent Information   Vent Mode AC/PRVC   Ventilator Settings   FiO2  100 %   Insp Time (sec) 0.88 sec   Vt (Set, mL) 500 mL   Resp Rate (Set) 20 bpm   PEEP/CPAP (cmH2O) 5   Vent Patient Data (Readings)   Vt (Measured) 747 mL   Peak Inspiratory Pressure (cmH2O) 9 cmH2O   Rate Measured 25 br/min   Minute Volume (L/min) 17.1 Liters   Mean Airway Pressure (cmH2O) 7 cmH20   Inspiratory Time 0.88 sec   I:E Ratio 1:1.6   I Time/ I Time % 0.88 s   Vent Alarm Settings   High Pressure (cmH2O) 40 cmH2O   Low Minute Volume (lpm) 2 L/min   High Minute Volume (lpm) 24 L/min   Low Exhaled Vt (ml) 200 mL   RR High (bpm) 40 br/min   Apnea (secs) 20 secs   Additional Respiratoray Assessments   Humidification Source HME   Ambu Bag With Mask At Bedside Yes   Airway Clearance   Suction Deep;ET Tube   Subglottic Suction Done Yes   Suction Device Inline suction catheter   Sputum Method Obtained Endotracheal   Sputum Amount Moderate   Sputum Color/Odor Brown   Sputum Consistency Thin   ETT    Placement Date/Time: 10/05/24 1526   Present on Admission/Arrival: No  Placed By: In ED  Airway Type: Cuffed  Airway Tube Size: 8 mm  Location: Oral  Secured At: 25 cm  Measured From: Teeth   Secured At 25 cm   Measured From Lips   ETT Placement Center   Secured By Commercial tube antonio   Site Assessment Dry   Cuff Pressure   (MOV)

## 2024-10-07 NOTE — PROGRESS NOTES
10/07/24 0823   Patient Observation   Pulse 80   Respirations 20   SpO2 100 %   Vent Information   Equipment Changed HME   Vent Mode AC/PRVC   Ventilator Settings   FiO2  30 %   Insp Time (sec) 0.88 sec   Vt (Set, mL) 500 mL   Resp Rate (Set) 20 bpm   PEEP/CPAP (cmH2O) 5   Vent Patient Data (Readings)   Vt (Measured) 535 mL   Peak Inspiratory Pressure (cmH2O) 19 cmH2O   Rate Measured 20 br/min   Minute Volume (L/min) 10.7 Liters   Mean Airway Pressure (cmH2O) 9.5 cmH20   Inspiratory Time 0.88 sec   I:E Ratio 1:2.4   Flow Sensitivity 3 L/min   Backup Apnea On   Vent Alarm Settings   High Pressure (cmH2O) 40 cmH2O   Low Minute Volume (lpm) 2 L/min   High Minute Volume (lpm) 20 L/min   Low Exhaled Vt (ml) 200 mL   High Exhaled Vt (ml) 1000 mL   RR High (bpm) 40 br/min   Apnea (secs) 20 secs   Additional Respiratoray Assessments   Humidification Source HME   Circuit Condensation Drained   Ambu Bag With Mask At Bedside Yes   Airway Clearance   Suction Oral;ET Tube   Subglottic Suction Done Yes   Suction Device Yankauer;Inline suction catheter   Sputum Method Obtained Endotracheal   Sputum Amount Small   Sputum Color/Odor Brown   Sputum Consistency Thick;Thin   ETT    Placement Date/Time: 10/05/24 1526   Present on Admission/Arrival: No  Placed By: In ED  Airway Type: Cuffed  Airway Tube Size: 8 mm  Location: Oral  Secured At: 25 cm  Measured From: Teeth   Secured At 25 cm   Measured From Lips   ETT Placement Center   Secured By Commercial tube antonio   Site Assessment Dry

## 2024-10-07 NOTE — PROGRESS NOTES
Patient meets criteria for spontaneous awakening trial. Started at 0400 hours. Sedation is currently reduced. Current RASS score is RASS -1 (Drowsy). Safety assessed. Restraints continued. Will monitor closely.      Pt sedation slowly lowered to attempt SAT.        Pt at around 25mcg of prop and 75mcg of fentanly becomes grossly and markedly agitated, starts purposefully pulling against b/l wrist restraint and going toward the ETT, thrashing his head, etc.      SAT abandoned for airway safety.      Pt did seem to follow some simple command during this time, but was just too agitated to continue on.      Jailyn

## 2024-10-07 NOTE — CONSULTS
10/07/2024 01:15 AM    MCHC 34.0 10/07/2024 01:15 AM    RDW 13.5 10/07/2024 01:15 AM    BANDSPCT 8.0 12/06/2009 05:13 AM    LYMPHOPCT 9.3 10/07/2024 01:15 AM    MONOPCT 9.8 10/07/2024 01:15 AM    EOSPCT 0.5 10/07/2024 01:15 AM    BASOPCT 0.3 10/07/2024 01:15 AM    MONOSABS 1.0 10/07/2024 01:15 AM    LYMPHSABS 0.9 10/07/2024 01:15 AM    EOSABS 0.0 10/07/2024 01:15 AM    BASOSABS 0.0 10/07/2024 01:15 AM    DIFFTYPE Manual 12/06/2009 05:13 AM     CMP:    Lab Results   Component Value Date/Time     10/07/2024 01:15 AM    K 3.6 10/07/2024 01:15 AM     10/07/2024 01:15 AM    CO2 23 10/07/2024 01:15 AM    BUN 14 10/07/2024 01:15 AM    CREATININE 1.1 10/07/2024 01:15 AM    GFRAA >60 12/07/2009 04:48 AM    AGRATIO 1.2 10/07/2024 01:15 AM    LABGLOM 74 10/07/2024 01:15 AM    GLUCOSE 116 10/07/2024 01:15 AM    CALCIUM 7.5 10/07/2024 01:15 AM    BILITOT 0.7 10/07/2024 01:15 AM    ALKPHOS 64 10/07/2024 01:15 AM     10/07/2024 01:15 AM     10/07/2024 01:15 AM     Albumin:  No results found for: \"LABALBU\"  Calcium:    Lab Results   Component Value Date/Time    CALCIUM 7.5 10/07/2024 01:15 AM     Magnesium:    Lab Results   Component Value Date/Time    MG 2.00 10/06/2024 05:55 AM       CXRAY:    Xray Result (most recent):  XR CHEST PORTABLE 10/05/2024    Narrative  EXAMINATION:  ONE XRAY VIEW OF THE CHEST    10/5/2024 3:36 pm    COMPARISON:  4 December 2009    HISTORY:  ORDERING SYSTEM PROVIDED HISTORY: ETT placement  TECHNOLOGIST PROVIDED HISTORY:  Reason for exam:->ETT placement    FINDINGS:  Hypoinflated.  ET tube identified in good position, 3.5 cm above the khari.  There is diffuse patchy airspace opacity throughout the right left hemithorax  more pronounced in upper lung zones.  The heart is mildly enlarged.  Overlying tubes.    Impression  Satisfactory ET tube placement.    Diffuse patchy airspace opacity especially in the upper lung zones.    Cardiac enlargement.    Consider CT scan of the chest

## 2024-10-07 NOTE — RT PROTOCOL NOTE
RT Inhaler-Nebulizer Bronchodilator Protocol Note    There is a bronchodilator order in the chart from a provider indicating to follow the RT Bronchodilator Protocol and there is an “Initiate RT Inhaler-Nebulizer Bronchodilator Protocol” order as well (see protocol at bottom of note).    CXR Findings:  XR CHEST PORTABLE    Result Date: 10/5/2024  Satisfactory ET tube placement. Diffuse patchy airspace opacity especially in the upper lung zones. Cardiac enlargement. Consider CT scan of the chest to further evaluate.       The findings from the last RT Protocol Assessment were as follows:   History Pulmonary Disease: None or smoker <15 pack years  Respiratory Pattern: Dyspnea on exertion or RR 21-25 bpm  Breath Sounds: Inspiratory and expiratory or bilateral wheezing and/or rhonchi  Cough: Strong, spontaneous, non-productive  Indication for Bronchodilator Therapy:    Bronchodilator Assessment Score: 8    Aerosolized bronchodilator medication orders have been revised according to the RT Inhaler-Nebulizer Bronchodilator Protocol below.    Respiratory Therapist to perform RT Therapy Protocol Assessment initially then follow the protocol.  Repeat RT Therapy Protocol Assessment PRN for score 0-3 or on second treatment, BID, and PRN for scores above 3.    No Indications - adjust the frequency to every 6 hours PRN wheezing or bronchospasm, if no treatments needed after 48 hours then discontinue using Per Protocol order mode.     If indication present, adjust the RT bronchodilator orders based on the Bronchodilator Assessment Score as indicated below.  Use Inhaler orders unless patient has one or more of the following: on home nebulizer, not able to hold breath for 10 seconds, is not alert and oriented, cannot activate and use MDI correctly, or respiratory rate 25 breaths per minute or more, then use the equivalent nebulizer order(s) with same Frequency and PRN reasons based on the score.  If a patient is on this medication

## 2024-10-07 NOTE — PROGRESS NOTES
10/07/24 1148   Patient Observation   Pulse 72   Respirations 14   SpO2 100 %   Breath Sounds   Breath Sounds Bilateral Diminished   Vent Information   Equipment Changed HME   Vent Mode AC/PRVC   Ventilator Settings   FiO2  30 %   Insp Time (sec) 0.88 sec   Vt (Set, mL) 500 mL   Resp Rate (Set) 14 bpm   PEEP/CPAP (cmH2O) 5   Vent Patient Data (Readings)   Vt (Measured) 543 mL   Peak Inspiratory Pressure (cmH2O) 20 cmH2O   Rate Measured 14 br/min   Minute Volume (L/min) 7.61 Liters   Mean Airway Pressure (cmH2O) 6.1 cmH20   Inspiratory Time 0.88 sec   I:E Ratio 1:3.9   Flow Sensitivity 3 L/min   Backup Apnea On   Additional Respiratoray Assessments   Humidification Source HME   Circuit Condensation Drained   Ambu Bag With Mask At Bedside Yes   ETT    Placement Date/Time: 10/05/24 1526   Present on Admission/Arrival: No  Placed By: In ED  Airway Type: Cuffed  Airway Tube Size: 8 mm  Location: Oral  Secured At: 25 cm  Measured From: Teeth   Secured At 25 cm   Measured From Lips   ETT Placement Right   Secured By Commercial tube antonio   Site Assessment Dry

## 2024-10-07 NOTE — PROGRESS NOTES
10/07/24 1420   Oxygen Therapy/Pulse Ox   O2 Therapy Oxygen   O2 Device Nasal cannula   O2 Flow Rate (L/min) 5 L/min   Pulse 90   Respirations 18   SpO2 98 %

## 2024-10-07 NOTE — PROGRESS NOTES
10/06/24  0555 10/07/24  0115   WBC 14.7* 10.5 10.0   HGB 13.7 12.8* 11.3*   HCT 40.3* 38.0* 33.2*   MCV 94.2 94.4 94.1    170 158     BMP:   Recent Labs     10/06/24  0050 10/06/24  0555 10/07/24  0115    139 138   K 4.3 4.1 3.6    107 105   CO2 24 23 23   BUN 12 12 14   CREATININE 1.1 1.0 1.1     LIVER PROFILE:   Recent Labs     10/06/24  0050 10/06/24  0555 10/07/24  0115   * 299* 165*   * 366* 239*   BILIDIR  --  0.2  --    BILITOT 0.8 0.9 0.7   ALKPHOS 73 75 64     PT/INR:   Recent Labs     10/07/24  0115   PROTIME 15.4*   INR 1.20*     APTT: No results for input(s): \"APTT\" in the last 72 hours.  UA:No results for input(s): \"NITRITE\", \"COLORU\", \"PHUR\", \"LABCAST\", \"WBCUA\", \"RBCUA\", \"MUCUS\", \"TRICHOMONAS\", \"YEAST\", \"BACTERIA\", \"CLARITYU\", \"SPECGRAV\", \"LEUKOCYTESUR\", \"UROBILINOGEN\", \"BILIRUBINUR\", \"BLOODU\", \"GLUCOSEU\", \"AMORPHOUS\" in the last 72 hours.    Invalid input(s): \"KETONESU\"      Assessment/Plan:  68 y.o. male with     S/p V.fib arrest  STEMI.  Multi vessel CAD s/p CORI to Circ.  Has multivessel disease.  CT surgery following   Acute respiratory failure on mechanical vent support   Hypotension: levo is down to 1 mcg/min   Pneumonia - likely aspiration    Decrease RR to 14  Awakening trial.  Pt was tracking.  He self extubated.    Continue unasyn  On ASA, brilina, lipitor and metoprolol     Pt has a high probability of imminent or life-threatening deterioration requiring close monitoring, and highly complex decision-making and/or interventions of high intensity to assess, manipulate, and support his critical organ systems to prevent a likely inevitable decline which could occur if left untreated.      A total critical care time 35 minutes was used. This includes but not limited to examining patient, collaborating with other physicians, monitoring vital signs, telemetry, continuous pulse oximetry, and clinical response to IV medications, documentation time, review and

## 2024-10-07 NOTE — PROGRESS NOTES
[facility] - Daily Progress/Follow-up Note      Admit Date:  10/5/2024    CHIEF COMPLAINT  Acute posterior MI, V-fib arrest      INTERVAL HISTORY:  Mr. Clay self extubated earlier today.  He appears confused but appears to be breathing comfortably on supplemental oxygen.  Unable to tell if he has any chest pain or other symptoms at present.      TELEMETRY: Mostly sinus rhythm, minimal ventricular ectopy over the last 12 hours      REVIEW OF SYSTEMS  10 point ROS not performed due to patient's confusion and disorientation      CARDIAC MEDICATIONS  Aspirin 81  Lipitor 80  Metoprolol 25 twice daily  Brilinta 90 twice daily    Family, medical and social history reviewed and updated as necessary.      VITALS  /63   Pulse 90   Temp 100.3 °F (37.9 °C) (Bladder)   Resp 18   Ht 1.829 m (6')   Wt 92.1 kg (203 lb 0.7 oz)   SpO2 98%   BMI 27.54 kg/m²     Intake/Output Summary (Last 24 hours) at 10/7/2024 1555  Last data filed at 10/7/2024 1417  Gross per 24 hour   Intake 946.6 ml   Output 815 ml   Net 131.6 ml       General appearance -awake but confused, not cooperative, + mild distress, appears stated age  Neck - Supple, symmetrical, trachea midline, no adenopathy, thyroid: not enlarged, symmetric, no tenderness/mass/nodules, no carotid bruit or JVD  Lungs -+ coarse breath sounds bilaterally, respirations unlabored  Chest wall - No tenderness or deformity  Heart - Regular rate and rhythm, S1, S2 normal, no murmur, no rub or gallop  Extremities - Extremities normal, atraumatic, no cyanosis or edema  Pulses - 2+ and symmetric upper and lower extremities      LABS:  Lab Results   Component Value Date/Time    WBC 10.0 10/07/2024 01:15 AM    RBC 3.53 10/07/2024 01:15 AM    HGB 11.3 10/07/2024 01:15 AM    HCT 33.2 10/07/2024 01:15 AM    MCV 94.1 10/07/2024 01:15 AM    RDW 13.5 10/07/2024 01:15 AM     10/07/2024 01:15 AM     Lab Results   Component Value Date/Time     10/07/2024 02:15 PM

## 2024-10-07 NOTE — PLAN OF CARE
Problem: Pain  Goal: Verbalizes/displays adequate comfort level or baseline comfort level  10/7/2024 1924 by Cruz Omalley RN  Outcome: Progressing  10/7/2024 0633 by Marisol Glaser RN  Outcome: Progressing  Flowsheets (Taken 10/6/2024 1944)  Verbalizes/displays adequate comfort level or baseline comfort level:   Assess pain using appropriate pain scale   Administer analgesics based on type and severity of pain and evaluate response   Consider cultural and social influences on pain and pain management   Implement non-pharmacological measures as appropriate and evaluate response     Problem: Discharge Planning  Goal: Discharge to home or other facility with appropriate resources  10/7/2024 1924 by Cruz Omalley RN  Outcome: Progressing  10/7/2024 0633 by Marisol Glaser RN  Outcome: Progressing     Problem: Safety - Adult  Goal: Free from fall injury  10/7/2024 1924 by Cruz Omalley RN  Outcome: Progressing  10/7/2024 0633 by Marisol Glaser RN  Outcome: Progressing  Flowsheets (Taken 10/6/2024 2356)  Free From Fall Injury: Instruct family/caregiver on patient safety     Problem: Neurosensory - Adult  Goal: Achieves stable or improved neurological status  10/7/2024 1924 by Cruz Omalley RN  Outcome: Progressing  10/7/2024 0633 by Marisol Glaser RN  Outcome: Progressing  Goal: Absence of seizures  10/7/2024 1924 by Cruz Omalley RN  Outcome: Progressing  10/7/2024 0633 by Marisol Glaser RN  Outcome: Progressing  Goal: Remains free of injury related to seizures activity  Outcome: Progressing  Goal: Achieves maximal functionality and self care  Outcome: Progressing     Problem: Respiratory - Adult  Goal: Achieves optimal ventilation and oxygenation  10/7/2024 1924 by Cruz Omalley RN  Outcome: Progressing  10/7/2024 0633 by Marisol Glaser RN  Outcome: Progressing     Problem: Cardiovascular - Adult  Goal: Maintains optimal cardiac output and  patient/family regarding the reason for restraint  4. Q2H: Monitor safety, psychosocial status, comfort, nutrition and hydration  Outcome: Progressing  Flowsheets  Taken 10/7/2024 1600 by Cruz Omalley RN  Remains free of injury from restraints (restraint for interference with medical device):   Every 2 hours: Monitor safety, psychosocial status, comfort, nutrition and hydration   Determine that other, less restrictive measures have been tried or would not be effective before applying the restraint   Inform patient/family regarding the reason for restraint   Evaluate the patient's condition at the time of restraint application  Taken 10/7/2024 1400 by Cruz Omalley RN  Remains free of injury from restraints (restraint for interference with medical device):   Every 2 hours: Monitor safety, psychosocial status, comfort, nutrition and hydration   Determine that other, less restrictive measures have been tried or would not be effective before applying the restraint   Inform patient/family regarding the reason for restraint   Evaluate the patient's condition at the time of restraint application  Taken 10/7/2024 1200 by Cruz Omalley RN  Remains free of injury from restraints (restraint for interference with medical device):   Every 2 hours: Monitor safety, psychosocial status, comfort, nutrition and hydration   Determine that other, less restrictive measures have been tried or would not be effective before applying the restraint   Inform patient/family regarding the reason for restraint   Evaluate the patient's condition at the time of restraint application  Taken 10/7/2024 1000 by Cruz Omalley RN  Remains free of injury from restraints (restraint for interference with medical device):   Every 2 hours: Monitor safety, psychosocial status, comfort, nutrition and hydration   Determine that other, less restrictive measures have been tried or would not be effective before applying the restraint   Inform patient/family

## 2024-10-08 ENCOUNTER — HOSPITAL ENCOUNTER (INPATIENT)
Dept: VASCULAR LAB | Age: 69
Discharge: HOME | DRG: 321 | End: 2024-10-10
Payer: MEDICARE

## 2024-10-08 ENCOUNTER — APPOINTMENT (OUTPATIENT)
Dept: VASCULAR LAB | Age: 69
DRG: 321 | End: 2024-10-08
Payer: MEDICARE

## 2024-10-08 ENCOUNTER — APPOINTMENT (OUTPATIENT)
Dept: GENERAL RADIOLOGY | Age: 69
DRG: 321 | End: 2024-10-08
Payer: MEDICARE

## 2024-10-08 PROBLEM — I25.5 ISCHEMIC CARDIOMYOPATHY: Status: ACTIVE | Noted: 2024-10-08

## 2024-10-08 PROBLEM — I21.29 ACUTE POSTERIOR MYOCARDIAL INFARCTION (HCC): Status: ACTIVE | Noted: 2024-10-08

## 2024-10-08 LAB
ANION GAP SERPL CALCULATED.3IONS-SCNC: 11 MMOL/L (ref 3–16)
BASOPHILS # BLD: 0 K/UL (ref 0–0.2)
BASOPHILS NFR BLD: 0.3 %
BUN SERPL-MCNC: 13 MG/DL (ref 7–20)
CALCIUM SERPL-MCNC: 8.1 MG/DL (ref 8.3–10.6)
CHLORIDE SERPL-SCNC: 105 MMOL/L (ref 99–110)
CO2 SERPL-SCNC: 22 MMOL/L (ref 21–32)
CREAT SERPL-MCNC: 1 MG/DL (ref 0.8–1.3)
DEPRECATED RDW RBC AUTO: 13 % (ref 12.4–15.4)
EOSINOPHIL # BLD: 0 K/UL (ref 0–0.6)
EOSINOPHIL NFR BLD: 0.4 %
GFR SERPLBLD CREATININE-BSD FMLA CKD-EPI: 83 ML/MIN/{1.73_M2}
GLUCOSE SERPL-MCNC: 111 MG/DL (ref 70–99)
HCT VFR BLD AUTO: 31.9 % (ref 40.5–52.5)
HGB BLD-MCNC: 11.2 G/DL (ref 13.5–17.5)
LYMPHOCYTES # BLD: 0.9 K/UL (ref 1–5.1)
LYMPHOCYTES NFR BLD: 9.3 %
MCH RBC QN AUTO: 32.7 PG (ref 26–34)
MCHC RBC AUTO-ENTMCNC: 35 G/DL (ref 31–36)
MCV RBC AUTO: 93.5 FL (ref 80–100)
MONOCYTES # BLD: 0.9 K/UL (ref 0–1.3)
MONOCYTES NFR BLD: 9.1 %
NEUTROPHILS # BLD: 8.1 K/UL (ref 1.7–7.7)
NEUTROPHILS NFR BLD: 80.9 %
PLATELET # BLD AUTO: 154 K/UL (ref 135–450)
PMV BLD AUTO: 7.4 FL (ref 5–10.5)
POTASSIUM SERPL-SCNC: 3.6 MMOL/L (ref 3.5–5.1)
RBC # BLD AUTO: 3.41 M/UL (ref 4.2–5.9)
SODIUM SERPL-SCNC: 138 MMOL/L (ref 136–145)
VAS LEFT CCA DIST EDV: 20.7 CM/S
VAS LEFT CCA DIST PSV: 112 CM/S
VAS LEFT CCA MID EDV: 27.6 CM/S
VAS LEFT CCA MID PSV: 140 CM/S
VAS LEFT CCA PROX EDV: 17.6 CM/S
VAS LEFT CCA PROX PSV: 94.2 CM/S
VAS LEFT ECA EDV: 14.4 CM/S
VAS LEFT ECA PSV: 142 CM/S
VAS LEFT GSV ANKLE DIAM: 1.2 MM
VAS LEFT GSV AT KNEE DIAM: 1.1 MM
VAS LEFT GSV BK DIST DIAM: 1.2 MM
VAS LEFT GSV BK MID DIAM: 1.2 MM
VAS LEFT GSV BK PROX DIAM: 1.3 MM
VAS LEFT GSV JUNC DIAM: 4.6 MM
VAS LEFT GSV THIGH DIST DIAM: 1.4 MM
VAS LEFT GSV THIGH MID DIAM: 1.7 MM
VAS LEFT GSV THIGH PROX DIAM: 1.8 MM
VAS LEFT ICA DIST EDV: 28.8 CM/S
VAS LEFT ICA DIST PSV: 90 CM/S
VAS LEFT ICA MID EDV: 31.2 CM/S
VAS LEFT ICA MID PSV: 136 CM/S
VAS LEFT ICA PROX EDV: 25.2 CM/S
VAS LEFT ICA PROX PSV: 166 CM/S
VAS LEFT ICA/CCA PSV: 1.19
VAS LEFT SUBCLAVIAN PROX EDV: 0 CM/S
VAS LEFT SUBCLAVIAN PROX PSV: 255 CM/S
VAS LEFT VERTEBRAL EDV: 17.6 CM/S
VAS LEFT VERTEBRAL PSV: 66.7 CM/S
VAS RIGHT CCA DIST EDV: 15.6 CM/S
VAS RIGHT CCA DIST PSV: 87 CM/S
VAS RIGHT CCA MID EDV: 23.8 CM/S
VAS RIGHT CCA MID PSV: 102 CM/S
VAS RIGHT CCA PROX EDV: 20.4 CM/S
VAS RIGHT CCA PROX PSV: 107 CM/S
VAS RIGHT ECA EDV: 13.7 CM/S
VAS RIGHT ECA PSV: 137 CM/S
VAS RIGHT GSV ANKLE DIAM: 1.1 MM
VAS RIGHT GSV AT KNEE DIAM: 1.5 MM
VAS RIGHT GSV BK DIST DIAM: 1.4 MM
VAS RIGHT GSV BK MID DIAM: 1.1 MM
VAS RIGHT GSV BK PROX DIAM: 1.3 MM
VAS RIGHT GSV THIGH DIST DIAM: 1.9 MM
VAS RIGHT GSV THIGH MID DIAM: 1.7 MM
VAS RIGHT GSV THIGH PROX DIAM: 1.8 MM
VAS RIGHT ICA DIST EDV: 14.6 CM/S
VAS RIGHT ICA DIST PSV: 76.3 CM/S
VAS RIGHT ICA MID EDV: 20.6 CM/S
VAS RIGHT ICA MID PSV: 90 CM/S
VAS RIGHT ICA PROX EDV: 19.6 CM/S
VAS RIGHT ICA PROX PSV: 87.6 CM/S
VAS RIGHT ICA/CCA PSV: 0.88
VAS RIGHT SUBCLAVIAN PROX PSV: 126 CM/S
VAS RIGHT VERTEBRAL EDV: 19.7 CM/S
VAS RIGHT VERTEBRAL PSV: 65.2 CM/S
WBC # BLD AUTO: 10 K/UL (ref 4–11)

## 2024-10-08 PROCEDURE — 2580000003 HC RX 258: Performed by: STUDENT IN AN ORGANIZED HEALTH CARE EDUCATION/TRAINING PROGRAM

## 2024-10-08 PROCEDURE — 92610 EVALUATE SWALLOWING FUNCTION: CPT

## 2024-10-08 PROCEDURE — 6360000002 HC RX W HCPCS: Performed by: INTERNAL MEDICINE

## 2024-10-08 PROCEDURE — 92611 MOTION FLUOROSCOPY/SWALLOW: CPT

## 2024-10-08 PROCEDURE — 6370000000 HC RX 637 (ALT 250 FOR IP): Performed by: NURSE PRACTITIONER

## 2024-10-08 PROCEDURE — 6370000000 HC RX 637 (ALT 250 FOR IP): Performed by: INTERNAL MEDICINE

## 2024-10-08 PROCEDURE — 74230 X-RAY XM SWLNG FUNCJ C+: CPT

## 2024-10-08 PROCEDURE — 2700000000 HC OXYGEN THERAPY PER DAY

## 2024-10-08 PROCEDURE — 93970 EXTREMITY STUDY: CPT

## 2024-10-08 PROCEDURE — 94010 BREATHING CAPACITY TEST: CPT

## 2024-10-08 PROCEDURE — 93880 EXTRACRANIAL BILAT STUDY: CPT | Performed by: INTERNAL MEDICINE

## 2024-10-08 PROCEDURE — 99233 SBSQ HOSP IP/OBS HIGH 50: CPT | Performed by: INTERNAL MEDICINE

## 2024-10-08 PROCEDURE — 80048 BASIC METABOLIC PNL TOTAL CA: CPT

## 2024-10-08 PROCEDURE — 99231 SBSQ HOSP IP/OBS SF/LOW 25: CPT

## 2024-10-08 PROCEDURE — 93880 EXTRACRANIAL BILAT STUDY: CPT

## 2024-10-08 PROCEDURE — 85025 COMPLETE CBC W/AUTO DIFF WBC: CPT

## 2024-10-08 PROCEDURE — 2580000003 HC RX 258: Performed by: INTERNAL MEDICINE

## 2024-10-08 PROCEDURE — 93970 EXTREMITY STUDY: CPT | Performed by: INTERNAL MEDICINE

## 2024-10-08 PROCEDURE — 92526 ORAL FUNCTION THERAPY: CPT

## 2024-10-08 PROCEDURE — 94761 N-INVAS EAR/PLS OXIMETRY MLT: CPT

## 2024-10-08 PROCEDURE — 6370000000 HC RX 637 (ALT 250 FOR IP): Performed by: STUDENT IN AN ORGANIZED HEALTH CARE EDUCATION/TRAINING PROGRAM

## 2024-10-08 PROCEDURE — 6360000002 HC RX W HCPCS: Performed by: STUDENT IN AN ORGANIZED HEALTH CARE EDUCATION/TRAINING PROGRAM

## 2024-10-08 PROCEDURE — 94640 AIRWAY INHALATION TREATMENT: CPT

## 2024-10-08 PROCEDURE — 2060000000 HC ICU INTERMEDIATE R&B

## 2024-10-08 RX ORDER — MECOBALAMIN 5000 MCG
10 TABLET,DISINTEGRATING ORAL NIGHTLY
Status: DISCONTINUED | OUTPATIENT
Start: 2024-10-08 | End: 2024-10-10 | Stop reason: HOSPADM

## 2024-10-08 RX ORDER — LIDOCAINE 4 G/G
1 PATCH TOPICAL DAILY
Status: DISCONTINUED | OUTPATIENT
Start: 2024-10-08 | End: 2024-10-10 | Stop reason: HOSPADM

## 2024-10-08 RX ORDER — METOPROLOL SUCCINATE 25 MG/1
25 TABLET, EXTENDED RELEASE ORAL 2 TIMES DAILY
Status: DISCONTINUED | OUTPATIENT
Start: 2024-10-08 | End: 2024-10-10 | Stop reason: HOSPADM

## 2024-10-08 RX ADMIN — MUPIROCIN: 20 OINTMENT TOPICAL at 08:50

## 2024-10-08 RX ADMIN — TICAGRELOR 90 MG: 90 TABLET ORAL at 20:29

## 2024-10-08 RX ADMIN — SODIUM CHLORIDE: 9 INJECTION, SOLUTION INTRAVENOUS at 18:43

## 2024-10-08 RX ADMIN — IPRATROPIUM BROMIDE AND ALBUTEROL SULFATE 1 DOSE: 2.5; .5 SOLUTION RESPIRATORY (INHALATION) at 15:27

## 2024-10-08 RX ADMIN — Medication 10 MG: at 20:28

## 2024-10-08 RX ADMIN — AMPICILLIN SODIUM AND SULBACTAM SODIUM 3000 MG: 2; 1 INJECTION, POWDER, FOR SOLUTION INTRAMUSCULAR; INTRAVENOUS at 12:35

## 2024-10-08 RX ADMIN — ENOXAPARIN SODIUM 40 MG: 100 INJECTION SUBCUTANEOUS at 08:48

## 2024-10-08 RX ADMIN — METOPROLOL SUCCINATE 25 MG: 25 TABLET, EXTENDED RELEASE ORAL at 08:47

## 2024-10-08 RX ADMIN — CHLORHEXIDINE GLUCONATE 15 ML: 1.2 RINSE ORAL at 20:28

## 2024-10-08 RX ADMIN — ACETAMINOPHEN 650 MG: 325 TABLET ORAL at 12:36

## 2024-10-08 RX ADMIN — ATORVASTATIN CALCIUM 80 MG: 80 TABLET, FILM COATED ORAL at 20:28

## 2024-10-08 RX ADMIN — TICAGRELOR 90 MG: 90 TABLET ORAL at 08:47

## 2024-10-08 RX ADMIN — SENNOSIDES 8.6 MG: 8.6 TABLET, FILM COATED ORAL at 08:47

## 2024-10-08 RX ADMIN — MUPIROCIN: 20 OINTMENT TOPICAL at 20:28

## 2024-10-08 RX ADMIN — DOCUSATE SODIUM LIQUID 100 MG: 100 LIQUID ORAL at 08:50

## 2024-10-08 RX ADMIN — METOPROLOL SUCCINATE 25 MG: 25 TABLET, EXTENDED RELEASE ORAL at 20:29

## 2024-10-08 RX ADMIN — IPRATROPIUM BROMIDE AND ALBUTEROL SULFATE 1 DOSE: 2.5; .5 SOLUTION RESPIRATORY (INHALATION) at 07:30

## 2024-10-08 RX ADMIN — SODIUM CHLORIDE, PRESERVATIVE FREE 10 ML: 5 INJECTION INTRAVENOUS at 08:49

## 2024-10-08 RX ADMIN — SENNOSIDES 8.6 MG: 8.6 TABLET, FILM COATED ORAL at 20:29

## 2024-10-08 RX ADMIN — AMPICILLIN SODIUM AND SULBACTAM SODIUM 3000 MG: 2; 1 INJECTION, POWDER, FOR SOLUTION INTRAMUSCULAR; INTRAVENOUS at 01:40

## 2024-10-08 RX ADMIN — AMPICILLIN SODIUM AND SULBACTAM SODIUM 3000 MG: 2; 1 INJECTION, POWDER, FOR SOLUTION INTRAMUSCULAR; INTRAVENOUS at 23:54

## 2024-10-08 RX ADMIN — AMPICILLIN SODIUM AND SULBACTAM SODIUM 3000 MG: 2; 1 INJECTION, POWDER, FOR SOLUTION INTRAMUSCULAR; INTRAVENOUS at 18:45

## 2024-10-08 RX ADMIN — DOCUSATE SODIUM LIQUID 100 MG: 100 LIQUID ORAL at 20:29

## 2024-10-08 RX ADMIN — PANTOPRAZOLE SODIUM 40 MG: 40 INJECTION, POWDER, FOR SOLUTION INTRAVENOUS at 08:47

## 2024-10-08 RX ADMIN — ASPIRIN 81 MG 81 MG: 81 TABLET ORAL at 08:47

## 2024-10-08 RX ADMIN — SODIUM CHLORIDE, PRESERVATIVE FREE 10 ML: 5 INJECTION INTRAVENOUS at 20:29

## 2024-10-08 RX ADMIN — AMPICILLIN SODIUM AND SULBACTAM SODIUM 3000 MG: 2; 1 INJECTION, POWDER, FOR SOLUTION INTRAMUSCULAR; INTRAVENOUS at 06:34

## 2024-10-08 RX ADMIN — SODIUM CHLORIDE: 9 INJECTION, SOLUTION INTRAVENOUS at 12:33

## 2024-10-08 RX ADMIN — MINERAL OIL, PETROLATUM: 425; 568 OINTMENT OPHTHALMIC at 01:44

## 2024-10-08 ASSESSMENT — PAIN DESCRIPTION - LOCATION
LOCATION: CHEST
LOCATION: CHEST

## 2024-10-08 ASSESSMENT — PAIN SCALES - GENERAL
PAINLEVEL_OUTOF10: 3
PAINLEVEL_OUTOF10: 0
PAINLEVEL_OUTOF10: 5
PAINLEVEL_OUTOF10: 0
PAINLEVEL_OUTOF10: 5
PAINLEVEL_OUTOF10: 3

## 2024-10-08 NOTE — ACP (ADVANCE CARE PLANNING)
Advance Care Planning     Advance Care Planning Inpatient Note  [facility] Department    Today's Date: 10/8/2024  Unit: [facility] A2 CARD TELEMETRY    Received request from HealthCare Provider.  Upon review of chart and communication with care team, patient's decision making abilities are not in question.. Patient and one of his sisters  was/were present in the room during visit.    Goals of ACP Conversation:  Discuss advance care planning documents    Health Care Decision Makers:       Primary Decision Maker: Milly Olivier - Brother/Sister - [phone]  Summary:  Updated Healthcare Decision Maker  Chano desires to name his sister, Milly Olivier, as his HCPOA.   Advance Care Planning Documents (Patient Wishes):  Chano desires to review ACP documents and complete them when his sister Milly is present at the hospital.      Assessment:  Chano and his sister engaged in conversation. They shared their other sister, Milly, would be visiting tomorrow. Chano indicated he did not desire to complete ACP documentation today but will plan to do so tomorrow.     Interventions:   provided ACP documents to Chano to review before ACP follow-up conversation tomorrow.     Care Preferences Communicated:   No    Outcomes/Plan:  ACP Discussion: Postponed    Electronically signed by Chaplain LISSETTE on 10/8/2024 at 3:04 PM
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
## 2024-10-08 NOTE — PLAN OF CARE
Problem: Pain  Goal: Verbalizes/displays adequate comfort level or baseline comfort level  Outcome: Progressing  Flowsheets (Taken 10/8/2024 0000 by Marisol Glaser, RN)  Verbalizes/displays adequate comfort level or baseline comfort level:   Assess pain using appropriate pain scale   Administer analgesics based on type and severity of pain and evaluate response   Implement non-pharmacological measures as appropriate and evaluate response   Consider cultural and social influences on pain and pain management  Note: Pt will be satisfied with pain control. Pt uses numeric pain rating scale with reassessments after pain med administration. Patient denies pain at this time. Will continue to monitor progression throughout shift.       Problem: Safety - Adult  Goal: Free from fall injury  Outcome: Progressing  Flowsheets (Taken 10/8/2024 0604 by Marisol Glaser, RN)  Free From Fall Injury: (pt agitated by bed alarm, not trying to get  OOB by self, bed alarm deferred r/t agitation)   Instruct family/caregiver on patient safety   Based on caregiver fall risk screen, instruct family/caregiver to ask for assistance with transferring infant if caregiver noted to have fall risk factors  Note: Pt will remain free from falls throughout hospital stay. Fall precautions in place, chair alarm on, bed in lowest position with wheels locked and side rails 2/4 up. Room door open and hourly rounding completed. Will continue to monitor throughout shift.       Problem: Skin/Tissue Integrity  Goal: Absence of new skin breakdown  Description: 1.  Monitor for areas of redness and/or skin breakdown  2.  Assess vascular access sites hourly  3.  Every 4-6 hours minimum:  Change oxygen saturation probe site  4.  Every 4-6 hours:  If on nasal continuous positive airway pressure, respiratory therapy assess nares and determine need for appliance change or resting period.  Outcome: Progressing  Note: Pt is at risk for skin
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
## 2024-10-08 NOTE — PROGRESS NOTES
Patient transferred to room 216 from 239 with all belongings. Family at bedside. Telemetry box applied and [facility] notified of transfer. Patient resting comfortably in the chair with the alarm on at this time. Call light within reach. Will continue to monitor.

## 2024-10-08 NOTE — PROGRESS NOTES
10/08/24  0515    140 138   K 3.6 3.8 3.6    109 105   CO2 23 19* 22   BUN 14 14 13   CREATININE 1.1 1.1 1.0   GLUCOSE 116* 109* 111*          Recent Labs     10/05/24  1513 10/06/24  0555 10/07/24  1415   MG 1.10* 2.00 1.80      No results for input(s): \"TROPONINI\" in the last 72 hours.  Recent Labs     10/07/24  0115   INR 1.20*     Assess/Plan:   69 y/o male with no significant pmhx who is admitted for cardiac arrest. ROSC achieved in ER and EKG showed STEMI. He received CORI X1 to left circumflex but extensive disease remains in LAD, D1, & distal RCA.    Our team continues to believe patient would benefit from interval CABG in 4-6 weeks. I have scheduled outpatient surgical consultation visit with Dr. Barnes on 10/31 at 10 AM. CT surgery will now sign off but remain available as needed for further questions/concerns. Plan reiterated to sister at bedside, and later to patient.    - Bedside PFT, vein mapping, & carotid duplex ordered  - No plan for surgery this admission  - Surgical consultation visit scheduled 10/31 at 10 AM  - CTS will now sign off    Malinda Dyson PA-C  10/8/2024  12:58 PM

## 2024-10-08 NOTE — PROGRESS NOTES
The pt has had little to no ectopy overnight.    Appears to be hemodynamically stable off all drips and tolerating po meds   Has converted to room air successfully s/p self extubation yesterday   Mannerisms of grunting, repeating words such as \"yep, uh-huh, umK,\" etc; unsure of cognitive/behavioral baseline   Ambulates with s/b assist to BR and voiding well   No elyte replacement this am   Coughing up bloody thick secretions well   Anxious, did not sleep   Night shift 10/8/24  Carlos BENNETT

## 2024-10-08 NOTE — PROGRESS NOTES
Received bedside report from off going RN.  Pts skin was assessed. Placed up in chair Orders verified. Pt A&O able to follow commands. Call light in reach, bed in lowest position, will continue to monitor.

## 2024-10-09 PROCEDURE — 6370000000 HC RX 637 (ALT 250 FOR IP): Performed by: INTERNAL MEDICINE

## 2024-10-09 PROCEDURE — 2580000003 HC RX 258: Performed by: STUDENT IN AN ORGANIZED HEALTH CARE EDUCATION/TRAINING PROGRAM

## 2024-10-09 PROCEDURE — 6360000002 HC RX W HCPCS: Performed by: STUDENT IN AN ORGANIZED HEALTH CARE EDUCATION/TRAINING PROGRAM

## 2024-10-09 PROCEDURE — 6370000000 HC RX 637 (ALT 250 FOR IP): Performed by: NURSE PRACTITIONER

## 2024-10-09 PROCEDURE — 99232 SBSQ HOSP IP/OBS MODERATE 35: CPT | Performed by: INTERNAL MEDICINE

## 2024-10-09 PROCEDURE — 6360000002 HC RX W HCPCS: Performed by: INTERNAL MEDICINE

## 2024-10-09 PROCEDURE — 2580000003 HC RX 258: Performed by: INTERNAL MEDICINE

## 2024-10-09 PROCEDURE — 2060000000 HC ICU INTERMEDIATE R&B

## 2024-10-09 RX ORDER — VALSARTAN 80 MG/1
80 TABLET ORAL DAILY
Status: DISCONTINUED | OUTPATIENT
Start: 2024-10-09 | End: 2024-10-10 | Stop reason: HOSPADM

## 2024-10-09 RX ADMIN — AMPICILLIN SODIUM AND SULBACTAM SODIUM 3000 MG: 2; 1 INJECTION, POWDER, FOR SOLUTION INTRAMUSCULAR; INTRAVENOUS at 14:13

## 2024-10-09 RX ADMIN — TICAGRELOR 90 MG: 90 TABLET ORAL at 21:18

## 2024-10-09 RX ADMIN — METOPROLOL SUCCINATE 25 MG: 25 TABLET, EXTENDED RELEASE ORAL at 10:24

## 2024-10-09 RX ADMIN — METOPROLOL SUCCINATE 25 MG: 25 TABLET, EXTENDED RELEASE ORAL at 21:18

## 2024-10-09 RX ADMIN — CHLORHEXIDINE GLUCONATE 15 ML: 1.2 RINSE ORAL at 21:27

## 2024-10-09 RX ADMIN — SODIUM CHLORIDE, PRESERVATIVE FREE 10 ML: 5 INJECTION INTRAVENOUS at 21:19

## 2024-10-09 RX ADMIN — Medication 10 MG: at 21:18

## 2024-10-09 RX ADMIN — AMPICILLIN SODIUM AND SULBACTAM SODIUM 3000 MG: 2; 1 INJECTION, POWDER, FOR SOLUTION INTRAMUSCULAR; INTRAVENOUS at 05:32

## 2024-10-09 RX ADMIN — VALSARTAN 80 MG: 80 TABLET, FILM COATED ORAL at 18:53

## 2024-10-09 RX ADMIN — SENNOSIDES 8.6 MG: 8.6 TABLET, FILM COATED ORAL at 21:18

## 2024-10-09 RX ADMIN — DOCUSATE SODIUM LIQUID 100 MG: 100 LIQUID ORAL at 21:18

## 2024-10-09 RX ADMIN — CHLORHEXIDINE GLUCONATE 15 ML: 1.2 RINSE ORAL at 10:24

## 2024-10-09 RX ADMIN — ATORVASTATIN CALCIUM 80 MG: 80 TABLET, FILM COATED ORAL at 21:18

## 2024-10-09 RX ADMIN — PANTOPRAZOLE SODIUM 40 MG: 40 INJECTION, POWDER, FOR SOLUTION INTRAVENOUS at 10:24

## 2024-10-09 RX ADMIN — ASPIRIN 81 MG 81 MG: 81 TABLET ORAL at 10:24

## 2024-10-09 RX ADMIN — ENOXAPARIN SODIUM 40 MG: 100 INJECTION SUBCUTANEOUS at 10:25

## 2024-10-09 RX ADMIN — TICAGRELOR 90 MG: 90 TABLET ORAL at 10:25

## 2024-10-09 RX ADMIN — AMPICILLIN SODIUM AND SULBACTAM SODIUM 3000 MG: 2; 1 INJECTION, POWDER, FOR SOLUTION INTRAMUSCULAR; INTRAVENOUS at 18:11

## 2024-10-09 RX ADMIN — SODIUM CHLORIDE, PRESERVATIVE FREE 10 ML: 5 INJECTION INTRAVENOUS at 14:13

## 2024-10-09 ASSESSMENT — PAIN SCALES - GENERAL
PAINLEVEL_OUTOF10: 0

## 2024-10-09 NOTE — PROGRESS NOTES
[facility] - Daily Progress/Follow-up Note      Admit Date:  10/5/2024    CHIEF COMPLAINT  Acute posterior MI, V-fib arrest      INTERVAL HISTORY:  Mr. Clay continues to do well on room air.  He appears appears much less confused and more coherent every day, is breathing comfortably on room air and denies any chest pain, shortness of breath or other cardiac symptoms.  He is now able to fully recall events prior to him losing consciousness at the time of his cardiac arrest.      TELEMETRY: sinus rhythm, essentially no ventricular ectopy until      REVIEW OF SYSTEMS  10 point ROS not performed due to patient's confusion and disorientation      CARDIAC MEDICATIONS  Aspirin 81  Lipitor 80  Metoprolol 25 twice daily  Brilinta 90 twice daily    Family, medical and social history reviewed and updated as necessary.      VITALS  BP (!) 142/68   Pulse 81   Temp 97.6 °F (36.4 °C) (Oral)   Resp 18   Ht 1.829 m (6')   Wt 92.1 kg (203 lb 0.7 oz)   SpO2 94%   BMI 27.54 kg/m²     Intake/Output Summary (Last 24 hours) at 10/9/2024 1551  Last data filed at 10/9/2024 0529  Gross per 24 hour   Intake 225.77 ml   Output 0 ml   Net 225.77 ml       General appearance - alert, cooperative, no distress, appears stated age  Neck - Supple, symmetrical, trachea midline, no adenopathy, thyroid: not enlarged, symmetric, no tenderness/mass/nodules, no carotid bruit or JVD  Lungs - Clear to auscultation bilaterally, respirations unlabored  Chest wall - No tenderness or deformity  Heart - Regular rate and rhythm, S1, S2 normal, no murmur, no rub or gallop  Extremities - Extremities normal, atraumatic, no cyanosis or edema  Pulses - 2+ and symmetric upper and lower extremities      LABS:  Lab Results   Component Value Date/Time    WBC 10.0 10/08/2024 05:15 AM    RBC 3.41 10/08/2024 05:15 AM    HGB 11.2 10/08/2024 05:15 AM    HCT 31.9 10/08/2024 05:15 AM    MCV 93.5 10/08/2024 05:15 AM    RDW 13.0 10/08/2024 05:15 AM    PLT  errors may be present

## 2024-10-09 NOTE — ACP (ADVANCE CARE PLANNING)
Advance Care Planning     Advance Care Planning Inpatient Note  [facility] Department    Today's Date: 10/9/2024  Unit: [facility] A2 CARD TELEMETRY    Received request from family.  Upon review of chart and communication with care team, patient's decision making abilities are not in question.. Patient and two sisters  was/were present in the room during visit.    Goals of ACP Conversation:  Discuss advance care planning documents    Health Care Decision Makers:       Primary Decision Maker: Milly Olivier - Brother/Sister - [phone]    Secondary Decision Maker: Genevieve Levine - Brother/Sister - [phone]  Summary:  Completed New Documents  Updated Healthcare Decision Maker    Advance Care Planning Documents (Patient Wishes):  Healthcare Power of /Advance Directive Appointment of Health Care Agent     Assessment:  Pt participated in ACP education and understood documents    Interventions:  Provided education on documents for clarity and greater understanding  Assisted in the completion of documents according to patient's wishes at this time    Care Preferences Communicated:   No    Outcomes/Plan:  ACP Discussion: Completed  New advance directive completed.  Returned original document(s) to patient, as well as copies for distribution to appointed agents  Copy of advance directive given to staff to scan into medical record.    Electronically signed by Chaplain Geo on 10/9/2024 at 2:07 PM

## 2024-10-09 NOTE — PROGRESS NOTES
[facility] Medicine Progress Note  V10/1      Date of Admission: 10/5/2024  Hospital Day: 5    Chief Admission Complaint:  \"cardiac arrest\"    Subjective:  no new c/o    Presenting Admission History:         \"68 y.o. male who presented to [facility] with cardiac arrest.  PMHx significant for NA.       Patient intubated and sedated.  No family at bedside.  Informed by nursing staff that patient was riding his bike and felt a weird feeling prompting him to call 911.  Patient left before EMS arrived.  Unknown time of patient being unconscious.  ROSC achieved prior to ED.  While in the ED patient arrested requiring 1 shock.  Patient was intubated.  Found to have STEMI on ECG.  Patient was taken to cath for stent x 1.     Patient presented to the emergency department via EMS with a temperature of 94.5 Fahrenheit.  Respiratory rate 21 satting at 100% on room air.  Heart rate 132.  /83.  Potassium 1.9.  CO2 12.  Magnesium 1.1.  Lactic acid 5.4.  Calcium 4.2.  Albumin 1.9.  , AST 99.  Glucose 157.  Hemoglobin 10.0, MCV 94.0.  ABG showed pH of 7.251, pCO2 47.5, pO2 80.5, HCO3 20.9.  CT head without contrast unremarkable.  CT chest abdomen pelvis showed possible pneumonia but could possibly be aspiration, no acute abnormality in abdomen or pelvis.  ECG showed sinus tachycardia with a rate of 116, STEMI.  Patient was started on heparin and brought to the Cath Lab for further treatment of STEMI.  After which patient was brought to the ICU and is currently stable at this time\".       Assessment/Plan:      Current Principal Problem:  Cardiac arrest      STEMI w/ Cardiac arrest.  S/P ACLS w/ ROSC.  Cardiology consulted and appreciated - taken emergently to the Cath Lab 5 October s/p CORI to Cx w/out complications.  Consultant notes from yesterday and today were reviewed, w/ plan for continued need for inpatient w/up and management - on ASA/Brilinta.  BBlocker/ACEi as tolerated. CT Surgery consulted and appreciated  documented elsewhere in the medical record)    [x] High (any 2)    A. Problems (any 1)  [x] Acute/Chronic Illness/injury posing ongoing threat to life or bodily function w/out ongoing treatment due to:  acute cardiac arrest requiring CT surgery eval and ongoing Cardiology tx.  [] Severe exacerbation of chronic illness:    ---------------------------------------------------------------------  B. Risk of Treatment (any 1)   [] Drugs/treatments that require intensive monitoring for toxicity include:    [] IV ABX requiring serial renal monitoring for nephrotoxicity:     [] Post-Cath/Contrast study requiring serial renal monitoring for Contrast Induced Nephropathy  [] IV Narcotic analgesia for ADR   [] Aggressive IV diuresis requiring serial monitoring for renal impairment and electrolyte derangements  [] Hypertonic Saline requiring serial renal monitoring for appropriate electrolyte correction rate   [] Critical electrolyte abnormalities requiring IV replacement and close serial monitoring  [] Insulin - monitoring FSBS for Hypoglycemic ADR  [] Anticoagulation requiring close serial monitoring and dose adjustments at high risk of ADR   [] HD requiring close serial monitoring of electrolytes and fluid status  [] Other - Remains on Vent.   [] Change in code status:    [] Decision to escalate care:    [] Major surgery/procedure with associated risk factors:    ----------------------------------------------------------------------  C. Data (any 2)  [] Data Review (any 3)  [x] All available Consultant notes from yesterday/today were reviewed - Cardiology.  [x] All current labs were reviewed on 10/9/2024 and interpreted for clinical significance   [x] Appropriate follow-up labs were ordered  [] Collateral history obtained:    [x] Independent Interpretation of tests (any 1)  [x] Telemetry (Rhythm Strip) personally reviewed and interpreted as documented above.      [] Imaging personally reviewed and interpreted, includes:    [x]

## 2024-10-09 NOTE — PLAN OF CARE
Problem: Safety - Adult  Goal: Free from fall injury  10/8/2024 2201 by Mansoor Rivas RN  Outcome: Progressing     Problem: Neurosensory - Adult  Goal: Achieves stable or improved neurological status  Outcome: Progressing     Problem: Neurosensory - Adult  Goal: Absence of seizures  Outcome: Progressing     Problem: Neurosensory - Adult  Goal: Remains free of injury related to seizures activity  10/8/2024 2201 by Mansoor Rivas RN  Outcome: Progressing     Problem: Neurosensory - Adult  Goal: Achieves maximal functionality and self care  10/8/2024 2201 by Mansoor Rivas RN  Outcome: Progressing

## 2024-10-09 NOTE — PROGRESS NOTES
10/08/2024 05:15 AM     Lab Results   Component Value Date/Time     10/08/2024 05:15 AM    K 3.6 10/08/2024 05:15 AM     10/08/2024 05:15 AM    CO2 22 10/08/2024 05:15 AM    BUN 13 10/08/2024 05:15 AM    CREATININE 1.0 10/08/2024 05:15 AM    GFRAA >60 12/07/2009 04:48 AM    AGRATIO 1.2 10/07/2024 01:15 AM    LABGLOM 83 10/08/2024 05:15 AM    GLUCOSE 111 10/08/2024 05:15 AM    CALCIUM 8.1 10/08/2024 05:15 AM    BILITOT 0.7 10/07/2024 01:15 AM    ALKPHOS 64 10/07/2024 01:15 AM     10/07/2024 01:15 AM     10/07/2024 01:15 AM     Lab Results   Component Value Date    TROPONINI <0.006 12/04/2009     No results found for: \"LDL\", \"LDLDIRECT\"        CARDIAC STUDIES    ECG: Normal sinus rhythm    Echo: 10/5/2024    Left Ventricle: Mildly reduced left ventricular systolic function with a visually estimated EF of 40 - 45%. EF by 2D Simpsons Biplane is 51%. Left ventricle size is normal. Normal wall thickness. Normal diastolic function.    Aortic Valve: Trileaflet valve. Mildly calcified cusps.    Tricuspid Valve: Mildly elevated RVSP, consistent with mild pulmonary hypertension. The estimated RVSP is 43 mmHg.    Image quality is adequate. Contrast used: Definity.    Stress test: n/a    Catheterization: 10/5/2024  LM Short, less than 10% proximal/mid-distal stenosis.         LAD Proximal calcification is noted with 20 to 30% stenosis, mid discrete 90% stenosis is noted at second diagonal artery which is the largest diagonal artery which itself has proximal-mid 90 to 95% stenosis.  Distal vessel has 10 to 20% stenosis.     D1 has ostial/proximal 80% stenosis, it is a small vessel.         LCX Medium to large size vessel, mild to moderately calcified, it has thrombus noted with 99% stenosis in the proximal-mid segment         RCA Dominant, calcified, proximal/mid 30% stenosis, distal eccentric 80% stenosis.     Successful PCI of circumflex with single drug stent  Residual RCA and LAD disease to be  treated medically  Consider staged high risk PCI versus CABG at a later date, this is less likely to be during this hospital admission but potentially at a later date pending recovery from this acute illness/cardiac arrest      ASSESSMENT & PLAN    Acute posterior MI status post PCI to circumflex  Ischemic cardiomyopathy with mildly reduced LV function EF 40 to 45%  V-fib arrest  Acute respiratory failure with hypoxia  Frequent ventricular ectopy resolved    Patient self extubated yesterday.  Appears less confused and oriented x 3 now with no complaints of chest pain or shortness of breath.  Hemodynamically stable recovering from acute MI and PCI to culprit circumflex.  No clinical signs or symptoms of heart failure or cardiogenic shock.  Has residual coronary disease in LAD/diagonal as well as RCA.    Continue aspirin 81, Brilinta 90 twice daily, high intensity statin  Continue Toprol-XL at current dose  Will plan to add ACE/ARB/ARNI as hemodynamics improve and stabilize, will hold off for now given borderline blood pressure  Can stop trending cardiac markers as they are trending down  Nutritional support for hypoalbuminemia  CT surgery consult requested for multivessel residual CAD involving LAD/diagonal as well as RCA, tentative plan for CABG after 4 to 6 weeks of DAPT  I do not see any need for urgent staged revascularization as he is hemodynamically stable and culprit circumflex has been revascularized already  If deemed unsuitable for surgery or if patient declines surgery, multivessel PCI to LAD/diagonal and RCA will be planned with Dr. Paul    All questions and concerns were addressed to the patient/family. Alternatives to my treatment as well as risks and benefits of proposed treatment were discussed and understood by patient/family.     Thank you for the consult, please call with questions.    Raffi Fung MD, FACC, [facility]  Interventional Cardiology  [facility]  [phone] (c)  10/8/2024

## 2024-10-09 NOTE — CARE COORDINATION
Spoke with patient at bedside for introduction.   Patient could potentially discharge tomorrow.    Cardiology has signed off.   Spiritual Care was able to follow up on consult for ACP.   Patient is from home with family and was independent prior to admission.  Please notify CM should any needs arise.

## 2024-10-10 VITALS
HEIGHT: 72 IN | WEIGHT: 203.04 LBS | RESPIRATION RATE: 16 BRPM | HEART RATE: 77 BPM | SYSTOLIC BLOOD PRESSURE: 137 MMHG | DIASTOLIC BLOOD PRESSURE: 76 MMHG | BODY MASS INDEX: 27.5 KG/M2 | TEMPERATURE: 98.4 F | OXYGEN SATURATION: 97 %

## 2024-10-10 PROBLEM — I46.9 CARDIAC ARREST: Status: RESOLVED | Noted: 2024-10-05 | Resolved: 2024-10-10

## 2024-10-10 PROBLEM — I47.20 VENTRICULAR TACHYCARDIA (HCC): Status: RESOLVED | Noted: 2024-10-06 | Resolved: 2024-10-10

## 2024-10-10 PROBLEM — I21.3 ST ELEVATION MYOCARDIAL INFARCTION (STEMI) (HCC): Status: RESOLVED | Noted: 2024-10-06 | Resolved: 2024-10-10

## 2024-10-10 PROBLEM — I21.29 ACUTE POSTERIOR MYOCARDIAL INFARCTION (HCC): Status: RESOLVED | Noted: 2024-10-08 | Resolved: 2024-10-10

## 2024-10-10 LAB
BACTERIA SPEC RESP CULT: ABNORMAL
GRAM STN SPEC: ABNORMAL
ORGANISM: ABNORMAL
ORGANISM: ABNORMAL
TRIGL SERPL-MCNC: 110 MG/DL (ref 0–150)

## 2024-10-10 PROCEDURE — 6370000000 HC RX 637 (ALT 250 FOR IP): Performed by: INTERNAL MEDICINE

## 2024-10-10 PROCEDURE — 2580000003 HC RX 258: Performed by: INTERNAL MEDICINE

## 2024-10-10 PROCEDURE — 2580000003 HC RX 258: Performed by: STUDENT IN AN ORGANIZED HEALTH CARE EDUCATION/TRAINING PROGRAM

## 2024-10-10 PROCEDURE — 84478 ASSAY OF TRIGLYCERIDES: CPT

## 2024-10-10 PROCEDURE — 6360000002 HC RX W HCPCS: Performed by: INTERNAL MEDICINE

## 2024-10-10 PROCEDURE — 36415 COLL VENOUS BLD VENIPUNCTURE: CPT

## 2024-10-10 RX ORDER — VALSARTAN 80 MG/1
80 TABLET ORAL DAILY
Qty: 90 TABLET | Refills: 3 | Status: SHIPPED | OUTPATIENT
Start: 2024-10-11

## 2024-10-10 RX ORDER — ASPIRIN 81 MG/1
81 TABLET, CHEWABLE ORAL DAILY
Qty: 90 TABLET | Refills: 3 | Status: SHIPPED | OUTPATIENT
Start: 2024-10-11

## 2024-10-10 RX ORDER — METOPROLOL SUCCINATE 25 MG/1
25 TABLET, EXTENDED RELEASE ORAL 2 TIMES DAILY
Qty: 90 TABLET | Refills: 3 | Status: SHIPPED | OUTPATIENT
Start: 2024-10-10

## 2024-10-10 RX ORDER — LEVOFLOXACIN 500 MG/1
750 TABLET, FILM COATED ORAL DAILY
Status: DISCONTINUED | OUTPATIENT
Start: 2024-10-10 | End: 2024-10-10 | Stop reason: HOSPADM

## 2024-10-10 RX ORDER — ATORVASTATIN CALCIUM 80 MG/1
80 TABLET, FILM COATED ORAL NIGHTLY
Qty: 90 TABLET | Refills: 3 | Status: SHIPPED | OUTPATIENT
Start: 2024-10-10

## 2024-10-10 RX ORDER — LEVOFLOXACIN 750 MG/1
750 TABLET, FILM COATED ORAL DAILY
Qty: 4 TABLET | Refills: 0 | Status: SHIPPED | OUTPATIENT
Start: 2024-10-11 | End: 2024-10-15

## 2024-10-10 RX ORDER — SENNA AND DOCUSATE SODIUM 50; 8.6 MG/1; MG/1
2 TABLET, FILM COATED ORAL DAILY PRN
Qty: 30 TABLET | Refills: 0 | Status: SHIPPED | OUTPATIENT
Start: 2024-10-10

## 2024-10-10 RX ADMIN — VALSARTAN 80 MG: 80 TABLET, FILM COATED ORAL at 08:21

## 2024-10-10 RX ADMIN — AMPICILLIN SODIUM AND SULBACTAM SODIUM 3000 MG: 2; 1 INJECTION, POWDER, FOR SOLUTION INTRAMUSCULAR; INTRAVENOUS at 06:24

## 2024-10-10 RX ADMIN — TICAGRELOR 90 MG: 90 TABLET ORAL at 08:21

## 2024-10-10 RX ADMIN — PANTOPRAZOLE SODIUM 40 MG: 40 INJECTION, POWDER, FOR SOLUTION INTRAVENOUS at 08:21

## 2024-10-10 RX ADMIN — LEVOFLOXACIN 750 MG: 500 TABLET, FILM COATED ORAL at 12:16

## 2024-10-10 RX ADMIN — ASPIRIN 81 MG 81 MG: 81 TABLET ORAL at 08:21

## 2024-10-10 RX ADMIN — AMPICILLIN SODIUM AND SULBACTAM SODIUM 3000 MG: 2; 1 INJECTION, POWDER, FOR SOLUTION INTRAMUSCULAR; INTRAVENOUS at 00:09

## 2024-10-10 RX ADMIN — METOPROLOL SUCCINATE 25 MG: 25 TABLET, EXTENDED RELEASE ORAL at 08:21

## 2024-10-10 RX ADMIN — SODIUM CHLORIDE, PRESERVATIVE FREE 10 ML: 5 INJECTION INTRAVENOUS at 08:21

## 2024-10-10 ASSESSMENT — PAIN SCALES - GENERAL: PAINLEVEL_OUTOF10: 0

## 2024-10-10 NOTE — PLAN OF CARE
Problem: Pain  Goal: Verbalizes/displays adequate comfort level or baseline comfort level  10/10/2024 1051 by Papa Young RN  Outcome: Adequate for Discharge  Flowsheets (Taken 10/10/2024 0800)  Verbalizes/displays adequate comfort level or baseline comfort level:   Encourage patient to monitor pain and request assistance   Assess pain using appropriate pain scale   Administer analgesics based on type and severity of pain and evaluate response  10/10/2024 0459 by Mansoor Rivas RN  Outcome: Progressing     Problem: Discharge Planning  Goal: Discharge to home or other facility with appropriate resources  10/10/2024 1051 by Papa Young RN  Outcome: Adequate for Discharge  Flowsheets (Taken 10/10/2024 0800)  Discharge to home or other facility with appropriate resources:   Identify barriers to discharge with patient and caregiver   Arrange for needed discharge resources and transportation as appropriate  10/10/2024 0459 by Mansoor Rivas RN  Outcome: Progressing     Problem: Safety - Adult  Goal: Free from fall injury  10/10/2024 1051 by Papa Young RN  Outcome: Adequate for Discharge  10/10/2024 0459 by Mansoor Rivas RN  Outcome: Progressing     Problem: Neurosensory - Adult  Goal: Achieves stable or improved neurological status  10/10/2024 1051 by Papa Young RN  Outcome: Adequate for Discharge  Flowsheets (Taken 10/10/2024 0800)  Achieves stable or improved neurological status:   Assess for and report changes in neurological status   Initiate measures to prevent increased intracranial pressure   Maintain blood pressure and fluid volume within ordered parameters to optimize cerebral perfusion and minimize risk of hemorrhage  10/10/2024 0459 by Mansoor Rivas RN  Outcome: Progressing  Goal: Absence of seizures  10/10/2024 1051 by Papa Young RN  Outcome: Adequate for Discharge  Flowsheets (Taken 10/10/2024 0800)  Absence of seizures:   Monitor for seizure activity.  If seizure occurs,

## 2024-10-10 NOTE — DISCHARGE SUMMARY
Discharge Summary    Name:  Chano Clay /Age/Sex: 1955  (68 y.o. male)   MRN & CSN:  [id number] & [id number] Admission Date/Time: 10/5/2024  3:06 PM   Attending:  No att. providers found Discharging Physician: Damaris Cosme MD     Discharge diagnosis and plan:  \"68 y.o. male who presented to [facility] with cardiac arrest.  PMHx significant for NA.    Patient intubated and sedated.  No family at bedside.  Informed by nursing staff that patient was riding his bike and felt a weird feeling prompting him to call 911.  Patient left before EMS arrived.  Unknown time of patient being unconscious.  ROSC achieved prior to ED.  While in the ED patient arrested requiring 1 shock.  Patient was intubated.  Found to have STEMI on ECG.  Patient was taken to cath for stent x 1. Patient presented to the emergency department via EMS with a temperature of 94.5 Fahrenheit.  Respiratory rate 21 satting at 100% on room air.  Heart rate 132.  /83.  Potassium 1.9.  CO2 12.  Magnesium 1.1.  Lactic acid 5.4.  Calcium 4.2.  Albumin 1.9.  , AST 99.  Glucose 157.  Hemoglobin 10.0, MCV 94.0.  ABG showed pH of 7.251, pCO2 47.5, pO2 80.5, HCO3 20.9.  CT head without contrast unremarkable.  CT chest abdomen pelvis showed possible pneumonia but could possibly be aspiration, no acute abnormality in abdomen or pelvis.  ECG showed sinus tachycardia with a rate of 116, STEMI.  Patient was started on heparin and brought to the Cath Lab for further treatment of STEMI.  After which patient was brought to the ICU and is currently stable at this time\".      STEMI w/ Cardiac arrest.  S/P ACLS w/ ROSC.  Cardiology consulted and appreciated - taken emergently to the Cath Lab  s/p CORI to Cx w/out complications.  Consultant notes from yesterday and today were reviewed, w/ plan for continued need for inpatient w/up and management - on ASA/Brilinta.  BBlocker/ACEi as tolerated. CT Surgery consulted and appreciated w/ CABG

## 2024-10-10 NOTE — PLAN OF CARE
Problem: Pain  Goal: Verbalizes/displays adequate comfort level or baseline comfort level  Outcome: Progressing     Problem: Discharge Planning  Goal: Discharge to home or other facility with appropriate resources  Outcome: Progressing     Problem: Safety - Adult  Goal: Free from fall injury  Outcome: Progressing     Problem: Neurosensory - Adult  Goal: Achieves stable or improved neurological status  Outcome: Progressing  Goal: Absence of seizures  Outcome: Progressing

## 2024-10-10 NOTE — PROGRESS NOTES
Pt education completed. AVS reviewed and provided. Pt discharged on wheelchair with sister as transportation. Follow-up information explained. Medications obtained at [facility] outpatient pharmacy.

## 2024-10-11 NOTE — PROCEDURES
PROCEDURE NOTE  Date: 10/10/2024   Name: Chano Clay  YOB: 1955    PFT Report    Date/Time: 10/10/2024 9:18 PM    Performed by: Azalia Alcantara MD  Authorized by: SCANNING, ONBASE        REASON FOR TEST:   Chest pain     TEST RESULTS:     SPIROMETRY:  Spirometry quality is good.  Test met ATS criteria for acceptability and reproducibility.  FEV1/FVC ratio is: 69%.  FEV1 is 1.2 L, 38% of predicted while FVC is 1.8 L, 36% of predicted.       IMPRESSION:  Severe obstruction.  Full PFTs with lung volumes and gas diffusion recommended.  Referral to pulmonary clinic recommended.  Clinical correlation recommended.    _____________________________________________________________  Electronically signed by:  Azalia Alcantara MD,FACP    10/10/2024    9:18 PM.     [facility]  [address], [place] [address]   Phone (office): [phone]

## 2024-10-17 LAB
BASE EXCESS BLDA CALC-SCNC: -2 MMOL/L (ref -3–3)
CA-I BLD-SCNC: 1.27 MMOL/L (ref 1.12–1.32)
CHLORIDE BLD-SCNC: 107 MMOL/L (ref 99–110)
CO2 BLDA-SCNC: 26 MMOL/L
GLUCOSE BLD-MCNC: 170 MG/DL (ref 70–99)
HCO3 BLDA-SCNC: 24.2 MMOL/L (ref 21–29)
HCT VFR BLD AUTO: 36 % (ref 40.5–52.5)
HGB BLD CALC-MCNC: 12.1 GM/DL (ref 13.5–17.5)
LACTATE BLD-SCNC: 1.72 MMOL/L (ref 0.4–2)
PCO2 BLDA: 49.1 MM HG (ref 35–45)
PERFORMED ON: ABNORMAL
PH BLDA: 7.3 [PH] (ref 7.35–7.45)
PO2 BLDA: 82.7 MM HG (ref 75–108)
POC CREATININE: 0.9 MG/DL (ref 0.8–1.3)
POC SAMPLE TYPE: ABNORMAL
POTASSIUM BLD-SCNC: 4.3 MMOL/L (ref 3.5–5.1)
SAO2 % BLDA: 95 % (ref 93–100)
SODIUM BLD-SCNC: 141 MMOL/L (ref 136–145)

## 2024-10-23 ENCOUNTER — HOSPITAL ENCOUNTER (OUTPATIENT)
Age: 69
Setting detail: SPECIMEN
Discharge: HOME | End: 2024-10-23
Payer: MEDICARE

## 2024-10-23 ENCOUNTER — OFFICE VISIT (OUTPATIENT)
Dept: CARDIOLOGY CLINIC | Age: 69
End: 2024-10-23

## 2024-10-23 VITALS
OXYGEN SATURATION: 97 % | SYSTOLIC BLOOD PRESSURE: 98 MMHG | DIASTOLIC BLOOD PRESSURE: 54 MMHG | BODY MASS INDEX: 25.87 KG/M2 | WEIGHT: 191 LBS | HEIGHT: 72 IN | HEART RATE: 71 BPM

## 2024-10-23 DIAGNOSIS — E78.2 MIXED HYPERLIPIDEMIA: ICD-10-CM

## 2024-10-23 DIAGNOSIS — R74.01 TRANSAMINITIS: ICD-10-CM

## 2024-10-23 DIAGNOSIS — I25.5 ISCHEMIC CARDIOMYOPATHY: ICD-10-CM

## 2024-10-23 DIAGNOSIS — I21.3 ST ELEVATION MYOCARDIAL INFARCTION (STEMI), SUBSEQUENT EPISODE OF CARE (HCC): ICD-10-CM

## 2024-10-23 DIAGNOSIS — Z86.74 HISTORY OF SUDDEN CARDIAC ARREST SUCCESSFULLY RESUSCITATED: ICD-10-CM

## 2024-10-23 DIAGNOSIS — I47.29 NSVT (NONSUSTAINED VENTRICULAR TACHYCARDIA) (HCC): ICD-10-CM

## 2024-10-23 DIAGNOSIS — I25.10 CORONARY ARTERY DISEASE INVOLVING NATIVE CORONARY ARTERY OF NATIVE HEART WITHOUT ANGINA PECTORIS: ICD-10-CM

## 2024-10-23 DIAGNOSIS — I25.10 CORONARY ARTERY DISEASE INVOLVING NATIVE CORONARY ARTERY OF NATIVE HEART WITHOUT ANGINA PECTORIS: Primary | ICD-10-CM

## 2024-10-23 LAB
ALBUMIN SERPL-MCNC: 3.9 G/DL (ref 3.4–5)
ALBUMIN/GLOB SERPL: 1.3 {RATIO} (ref 1.1–2.2)
ALP SERPL-CCNC: 121 U/L (ref 40–129)
ALT SERPL-CCNC: 26 U/L (ref 10–40)
ANION GAP SERPL CALCULATED.3IONS-SCNC: 11 MMOL/L (ref 3–16)
AST SERPL-CCNC: 24 U/L (ref 15–37)
BASE EXCESS BLDA CALC-SCNC: -2 MMOL/L (ref -3–3)
BILIRUB SERPL-MCNC: 0.5 MG/DL (ref 0–1)
BODY TEMPERATURE: ABNORMAL
BUN SERPL-MCNC: 15 MG/DL (ref 7–20)
CA-I BLD-SCNC: 1.27 MMOL/L (ref 1.12–1.32)
CALCIUM SERPL-MCNC: 9.6 MG/DL (ref 8.3–10.6)
CHLORIDE BLD-SCNC: 107 MMOL/L (ref 99–110)
CHLORIDE SERPL-SCNC: 105 MMOL/L (ref 99–110)
CO2 BLDA-SCNC: 26 MMOL/L
CO2 SERPL-SCNC: 27 MMOL/L (ref 21–32)
CREAT SERPL-MCNC: 1 MG/DL (ref 0.8–1.3)
DEPRECATED RDW RBC AUTO: 13.3 % (ref 12.4–15.4)
GFR SERPLBLD CREATININE-BSD FMLA CKD-EPI: 82 ML/MIN/{1.73_M2}
GLUCOSE BLD-MCNC: 170 MG/DL (ref 70–99)
GLUCOSE SERPL-MCNC: 75 MG/DL (ref 70–99)
HCO3 BLDA-SCNC: 24.2 MMOL/L (ref 21–29)
HCT VFR BLD AUTO: 36 % (ref 40.5–52.5)
HCT VFR BLD AUTO: 39.6 % (ref 40.5–52.5)
HGB BLD CALC-MCNC: 12.1 GM/DL (ref 13.5–17.5)
HGB BLD-MCNC: 13.7 G/DL (ref 13.5–17.5)
LACTATE BLD-SCNC: 1.72 MMOL/L (ref 0.4–2)
MAGNESIUM SERPL-MCNC: 2.34 MG/DL (ref 1.8–2.4)
MCH RBC QN AUTO: 32.7 PG (ref 26–34)
MCHC RBC AUTO-ENTMCNC: 34.5 G/DL (ref 31–36)
MCV RBC AUTO: 94.9 FL (ref 80–100)
PCO2 BLDA: 49.1 MM HG (ref 35–45)
PERFORMED ON: ABNORMAL
PH BLDA: 7.3 [PH] (ref 7.35–7.45)
PLATELET # BLD AUTO: 333 K/UL (ref 135–450)
PMV BLD AUTO: 7.9 FL (ref 5–10.5)
PO2 BLDA: 82.7 MM HG (ref 75–108)
POC CREATININE: 0.9 MG/DL (ref 0.8–1.3)
POC SAMPLE TYPE: ABNORMAL
POTASSIUM BLD-SCNC: 4.3 MMOL/L (ref 3.5–5.1)
POTASSIUM SERPL-SCNC: 4.3 MMOL/L (ref 3.5–5.1)
PROT SERPL-MCNC: 6.9 G/DL (ref 6.4–8.2)
RBC # BLD AUTO: 4.18 M/UL (ref 4.2–5.9)
SAO2 % BLDA: 95 % (ref 93–100)
SODIUM BLD-SCNC: 141 MMOL/L (ref 136–145)
SODIUM SERPL-SCNC: 143 MMOL/L (ref 136–145)
WBC # BLD AUTO: 6.2 K/UL (ref 4–11)

## 2024-10-23 PROCEDURE — 36415 COLL VENOUS BLD VENIPUNCTURE: CPT

## 2024-10-23 PROCEDURE — 85027 COMPLETE CBC AUTOMATED: CPT

## 2024-10-23 PROCEDURE — 80053 COMPREHEN METABOLIC PANEL: CPT

## 2024-10-23 PROCEDURE — 83735 ASSAY OF MAGNESIUM: CPT

## 2024-10-23 NOTE — PATIENT INSTRUCTIONS
Start back on the Brilinta 90 mg twice daily - take 2 tablets with first dose - TODAY!  Continue same doses of atorvastatin, aspirin, Toprol XL and valsartan  Have blood work at earliest convenience - does not need to be fasting  Keep appointment with Dr. Barnes next week to discuss bypass vs percutaneous coronary intervention   Schedule an appointment with Dr. Paul in 3 months (or sooner if needed)  Your provider has ordered lab work for further evaluation. The order/prescription is included in your paper work.     [facility] located on the [facility]. The address is [address] on the first floor. It is the building directly across from the Emergency Room. The main entrance to this building faces [address]. You will have to drive around the building to locate the main entrance.

## 2024-11-05 ENCOUNTER — TELEPHONE (OUTPATIENT)
Dept: CARDIOLOGY CLINIC | Age: 69
End: 2024-11-05

## 2024-11-05 NOTE — TELEPHONE ENCOUNTER
Lets set up pt for high risk mv pci of lad/diag and rca with gen anaesthesia.  Will need [employer] for reps.  Thank you.

## 2024-11-05 NOTE — TELEPHONE ENCOUNTER
Pt called to scheduled procedure. Pt saw  Dr. Barnes 10/31/24 to discuss bypass vs percutaneous coronary intervention. Please contact Kaylan PEDRAZA procedure  with details. TY.

## 2024-11-05 NOTE — TELEPHONE ENCOUNTER
Lets set up pt for high risk mv pci of lad/diag and rca with gen anaesthesia.  Will need [employer] for reps.  Thank you.

## 2024-11-07 NOTE — TELEPHONE ENCOUNTER
Procedure:  PCI (high risk)  Doctor:  Dr. Paul  Date:  12/9/24  Time:  9am  Arrival:  7:30am  Reps:  William  Anesthesia:  Yes      Spoke with patient. Please have patient arrive to the main entrance of [facility] ([address] [place] [id number]) and check in with the registration desk.  They will be directed to the Cath Lab.  Please call patient regarding medication instructions. Remind patient to be NPO after midnight (8 hours prior). Do not apply lotions/creams on skin the day of procedure.

## 2024-11-08 NOTE — TELEPHONE ENCOUNTER
Reviewed medication list; no medications need held.     -Hold vitamins and minerals the morning of procedure  -Hold lotions and creams the morning of procedure     Attempted to call patient; call disconnected. Unable to make contact. Will re attempt later.

## 2024-12-09 ENCOUNTER — ANESTHESIA (OUTPATIENT)
Dept: CARDIAC CATH/INVASIVE PROCEDURES | Age: 69
DRG: 321 | End: 2024-12-09
Payer: MEDICARE

## 2024-12-09 ENCOUNTER — ANESTHESIA EVENT (OUTPATIENT)
Dept: CARDIAC CATH/INVASIVE PROCEDURES | Age: 69
DRG: 321 | End: 2024-12-09
Payer: MEDICARE

## 2024-12-09 ENCOUNTER — HOSPITAL ENCOUNTER (INPATIENT)
Age: 69
LOS: 1 days | Discharge: HOME | DRG: 321 | End: 2024-12-10
Attending: INTERNAL MEDICINE | Admitting: INTERNAL MEDICINE
Payer: MEDICARE

## 2024-12-09 DIAGNOSIS — I25.10 ATHEROSCLEROSIS OF NATIVE CORONARY ARTERY OF NATIVE HEART, UNSPECIFIED WHETHER ANGINA PRESENT: ICD-10-CM

## 2024-12-09 PROBLEM — I21.3 STEMI (ST ELEVATION MYOCARDIAL INFARCTION) (HCC): Status: ACTIVE | Noted: 2024-12-09

## 2024-12-09 PROBLEM — Z98.890 STATUS POST CARDIAC CATHETERIZATION: Status: ACTIVE | Noted: 2024-12-09

## 2024-12-09 LAB
ANION GAP SERPL CALCULATED.3IONS-SCNC: 11 MMOL/L (ref 3–16)
BUN SERPL-MCNC: 14 MG/DL (ref 7–20)
CALCIUM SERPL-MCNC: 9 MG/DL (ref 8.3–10.6)
CHLORIDE SERPL-SCNC: 107 MMOL/L (ref 99–110)
CHOLEST SERPL-MCNC: 146 MG/DL (ref 0–199)
CO2 SERPL-SCNC: 23 MMOL/L (ref 21–32)
CREAT SERPL-MCNC: 1 MG/DL (ref 0.8–1.3)
DEPRECATED RDW RBC AUTO: 13.7 % (ref 12.4–15.4)
EKG ATRIAL RATE: 64 BPM
EKG ATRIAL RATE: 85 BPM
EKG DIAGNOSIS: NORMAL
EKG DIAGNOSIS: NORMAL
EKG P AXIS: 60 DEGREES
EKG P AXIS: 82 DEGREES
EKG P-R INTERVAL: 182 MS
EKG P-R INTERVAL: 196 MS
EKG Q-T INTERVAL: 394 MS
EKG Q-T INTERVAL: 408 MS
EKG QRS DURATION: 88 MS
EKG QRS DURATION: 96 MS
EKG QTC CALCULATION (BAZETT): 406 MS
EKG QTC CALCULATION (BAZETT): 485 MS
EKG R AXIS: 3 DEGREES
EKG R AXIS: 44 DEGREES
EKG T AXIS: 52 DEGREES
EKG T AXIS: 68 DEGREES
EKG VENTRICULAR RATE: 64 BPM
EKG VENTRICULAR RATE: 85 BPM
GFR SERPLBLD CREATININE-BSD FMLA CKD-EPI: 82 ML/MIN/{1.73_M2}
GLUCOSE SERPL-MCNC: 117 MG/DL (ref 70–99)
HCT VFR BLD AUTO: 40.2 % (ref 40.5–52.5)
HDLC SERPL-MCNC: 44 MG/DL (ref 40–60)
HGB BLD-MCNC: 13.6 G/DL (ref 13.5–17.5)
LDLC SERPL CALC-MCNC: 87 MG/DL
MCH RBC QN AUTO: 31.6 PG (ref 26–34)
MCHC RBC AUTO-ENTMCNC: 33.8 G/DL (ref 31–36)
MCV RBC AUTO: 93.6 FL (ref 80–100)
PLATELET # BLD AUTO: 211 K/UL (ref 135–450)
PMV BLD AUTO: 7.1 FL (ref 5–10.5)
POC ACT LR: 277 SEC
POC ACT LR: 298 SEC
POC ACT LR: 303 SEC
POC ACT LR: 318 SEC
POC ACT LR: 327 SEC
POC ACT LR: 329 SEC
POC ACT LR: 351 SEC
POC ACT LR: 357 SEC
POC ACT LR: 360 SEC
POC ACT LR: 373 SEC
POC ACT LR: 383 SEC
POC ACT LR: >400 SEC
POTASSIUM SERPL-SCNC: 4.2 MMOL/L (ref 3.5–5.1)
RBC # BLD AUTO: 4.29 M/UL (ref 4.2–5.9)
SODIUM SERPL-SCNC: 141 MMOL/L (ref 136–145)
TRIGL SERPL-MCNC: 77 MG/DL (ref 0–150)
VLDLC SERPL CALC-MCNC: 15 MG/DL
WBC # BLD AUTO: 5.2 K/UL (ref 4–11)

## 2024-12-09 PROCEDURE — C1887 CATHETER, GUIDING: HCPCS | Performed by: INTERNAL MEDICINE

## 2024-12-09 PROCEDURE — 027035Z DILATION OF CORONARY ARTERY, ONE ARTERY WITH TWO DRUG-ELUTING INTRALUMINAL DEVICES, PERCUTANEOUS APPROACH: ICD-10-PCS | Performed by: INTERNAL MEDICINE

## 2024-12-09 PROCEDURE — 2580000003 HC RX 258: Performed by: INTERNAL MEDICINE

## 2024-12-09 PROCEDURE — C1753 CATH, INTRAVAS ULTRASOUND: HCPCS | Performed by: INTERNAL MEDICINE

## 2024-12-09 PROCEDURE — B2111ZZ FLUOROSCOPY OF MULTIPLE CORONARY ARTERIES USING LOW OSMOLAR CONTRAST: ICD-10-PCS | Performed by: INTERNAL MEDICINE

## 2024-12-09 PROCEDURE — 93010 ELECTROCARDIOGRAM REPORT: CPT | Performed by: INTERNAL MEDICINE

## 2024-12-09 PROCEDURE — 2500000003 HC RX 250 WO HCPCS: Performed by: INTERNAL MEDICINE

## 2024-12-09 PROCEDURE — 93005 ELECTROCARDIOGRAM TRACING: CPT | Performed by: INTERNAL MEDICINE

## 2024-12-09 PROCEDURE — 80048 BASIC METABOLIC PNL TOTAL CA: CPT

## 2024-12-09 PROCEDURE — C1894 INTRO/SHEATH, NON-LASER: HCPCS | Performed by: INTERNAL MEDICINE

## 2024-12-09 PROCEDURE — 92979 ENDOLUMINL IVUS OCT C EA: CPT | Performed by: INTERNAL MEDICINE

## 2024-12-09 PROCEDURE — 4A023N7 MEASUREMENT OF CARDIAC SAMPLING AND PRESSURE, LEFT HEART, PERCUTANEOUS APPROACH: ICD-10-PCS | Performed by: INTERNAL MEDICINE

## 2024-12-09 PROCEDURE — 2500000003 HC RX 250 WO HCPCS: Performed by: NURSE ANESTHETIST, CERTIFIED REGISTERED

## 2024-12-09 PROCEDURE — C1769 GUIDE WIRE: HCPCS | Performed by: INTERNAL MEDICINE

## 2024-12-09 PROCEDURE — 92978 ENDOLUMINL IVUS OCT C 1ST: CPT | Performed by: INTERNAL MEDICINE

## 2024-12-09 PROCEDURE — 93458 L HRT ARTERY/VENTRICLE ANGIO: CPT | Performed by: INTERNAL MEDICINE

## 2024-12-09 PROCEDURE — 85027 COMPLETE CBC AUTOMATED: CPT

## 2024-12-09 PROCEDURE — 2580000003 HC RX 258: Performed by: NURSE ANESTHETIST, CERTIFIED REGISTERED

## 2024-12-09 PROCEDURE — 2709999900 HC NON-CHARGEABLE SUPPLY: Performed by: INTERNAL MEDICINE

## 2024-12-09 PROCEDURE — 92928 PRQ TCAT PLMT NTRAC ST 1 LES: CPT | Performed by: INTERNAL MEDICINE

## 2024-12-09 PROCEDURE — 92929 PR PRQ TRLUML CORONARY STENT W/ANGIO ADDL ART/BRNCH: CPT | Performed by: INTERNAL MEDICINE

## 2024-12-09 PROCEDURE — C1874 STENT, COATED/COV W/DEL SYS: HCPCS | Performed by: INTERNAL MEDICINE

## 2024-12-09 PROCEDURE — 6370000000 HC RX 637 (ALT 250 FOR IP): Performed by: INTERNAL MEDICINE

## 2024-12-09 PROCEDURE — 6360000002 HC RX W HCPCS: Performed by: NURSE ANESTHETIST, CERTIFIED REGISTERED

## 2024-12-09 PROCEDURE — 6360000002 HC RX W HCPCS: Performed by: INTERNAL MEDICINE

## 2024-12-09 PROCEDURE — 7100000010 HC PHASE II RECOVERY - FIRST 15 MIN: Performed by: INTERNAL MEDICINE

## 2024-12-09 PROCEDURE — C9600 PERC DRUG-EL COR STENT SING: HCPCS | Performed by: INTERNAL MEDICINE

## 2024-12-09 PROCEDURE — 7100000001 HC PACU RECOVERY - ADDTL 15 MIN: Performed by: INTERNAL MEDICINE

## 2024-12-09 PROCEDURE — 7100000000 HC PACU RECOVERY - FIRST 15 MIN: Performed by: INTERNAL MEDICINE

## 2024-12-09 PROCEDURE — C1725 CATH, TRANSLUMIN NON-LASER: HCPCS | Performed by: INTERNAL MEDICINE

## 2024-12-09 PROCEDURE — 3700000000 HC ANESTHESIA ATTENDED CARE: Performed by: INTERNAL MEDICINE

## 2024-12-09 PROCEDURE — B240ZZ3 ULTRASONOGRAPHY OF SINGLE CORONARY ARTERY, INTRAVASCULAR: ICD-10-PCS | Performed by: INTERNAL MEDICINE

## 2024-12-09 PROCEDURE — 80061 LIPID PANEL: CPT

## 2024-12-09 PROCEDURE — C9601 PERC DRUG-EL COR STENT BRAN: HCPCS | Performed by: INTERNAL MEDICINE

## 2024-12-09 PROCEDURE — 3700000001 HC ADD 15 MINUTES (ANESTHESIA): Performed by: INTERNAL MEDICINE

## 2024-12-09 PROCEDURE — 2060000000 HC ICU INTERMEDIATE R&B

## 2024-12-09 PROCEDURE — 99152 MOD SED SAME PHYS/QHP 5/>YRS: CPT | Performed by: INTERNAL MEDICINE

## 2024-12-09 PROCEDURE — 0270356 DILATION OF CORONARY ARTERY, ONE ARTERY, BIFURCATION, WITH TWO DRUG-ELUTING INTRALUMINAL DEVICES, PERCUTANEOUS APPROACH: ICD-10-PCS | Performed by: INTERNAL MEDICINE

## 2024-12-09 PROCEDURE — B2151ZZ FLUOROSCOPY OF LEFT HEART USING LOW OSMOLAR CONTRAST: ICD-10-PCS | Performed by: INTERNAL MEDICINE

## 2024-12-09 PROCEDURE — 7100000011 HC PHASE II RECOVERY - ADDTL 15 MIN: Performed by: INTERNAL MEDICINE

## 2024-12-09 PROCEDURE — 6370000000 HC RX 637 (ALT 250 FOR IP)

## 2024-12-09 PROCEDURE — 6360000004 HC RX CONTRAST MEDICATION: Performed by: INTERNAL MEDICINE

## 2024-12-09 PROCEDURE — 85347 COAGULATION TIME ACTIVATED: CPT

## 2024-12-09 PROCEDURE — 51798 US URINE CAPACITY MEASURE: CPT

## 2024-12-09 PROCEDURE — 2580000003 HC RX 258

## 2024-12-09 DEVICE — STENT ONYXNG30015UX ONYX 3.00X15RX
Type: IMPLANTABLE DEVICE | Status: FUNCTIONAL
Brand: ONYX FRONTIER™

## 2024-12-09 DEVICE — STENT ONYXNG27530UX ONYX 2.75X30RX
Type: IMPLANTABLE DEVICE | Status: FUNCTIONAL
Brand: ONYX FRONTIER™

## 2024-12-09 DEVICE — STENT ONYXNG35018UX ONYX 3.50X18RX
Type: IMPLANTABLE DEVICE | Status: FUNCTIONAL
Brand: ONYX FRONTIER™

## 2024-12-09 DEVICE — STENT ONYXNG30022UX ONYX 3.00X22RX
Type: IMPLANTABLE DEVICE | Status: FUNCTIONAL
Brand: ONYX FRONTIER™

## 2024-12-09 DEVICE — STENT ONYXNG40015UX ONYX 4.00X15RX
Type: IMPLANTABLE DEVICE | Status: FUNCTIONAL
Brand: ONYX FRONTIER™

## 2024-12-09 DEVICE — STENT ONYXNG27534UX ONYX 2.75X34RX
Type: IMPLANTABLE DEVICE | Status: FUNCTIONAL
Brand: ONYX FRONTIER™

## 2024-12-09 RX ORDER — DIPHENHYDRAMINE HYDROCHLORIDE 50 MG/ML
12.5 INJECTION INTRAMUSCULAR; INTRAVENOUS
Status: CANCELLED | OUTPATIENT
Start: 2024-12-09 | End: 2024-12-10

## 2024-12-09 RX ORDER — SODIUM CHLORIDE 0.9 % (FLUSH) 0.9 %
5-40 SYRINGE (ML) INJECTION EVERY 12 HOURS SCHEDULED
Status: CANCELLED | OUTPATIENT
Start: 2024-12-09

## 2024-12-09 RX ORDER — SODIUM CHLORIDE 9 MG/ML
INJECTION, SOLUTION INTRAVENOUS PRN
Status: DISCONTINUED | OUTPATIENT
Start: 2024-12-09 | End: 2024-12-10 | Stop reason: HOSPADM

## 2024-12-09 RX ORDER — NOREPINEPHRINE BITARTRATE 1 MG/ML
INJECTION, SOLUTION INTRAVENOUS
Status: DISCONTINUED | OUTPATIENT
Start: 2024-12-09 | End: 2024-12-09 | Stop reason: SDUPTHER

## 2024-12-09 RX ORDER — SODIUM CHLORIDE 0.9 % (FLUSH) 0.9 %
5-40 SYRINGE (ML) INJECTION PRN
Status: DISCONTINUED | OUTPATIENT
Start: 2024-12-09 | End: 2024-12-10 | Stop reason: HOSPADM

## 2024-12-09 RX ORDER — PHENYLEPHRINE HCL IN 0.9% NACL 1 MG/10 ML
SYRINGE (ML) INTRAVENOUS
Status: DISCONTINUED | OUTPATIENT
Start: 2024-12-09 | End: 2024-12-09 | Stop reason: SDUPTHER

## 2024-12-09 RX ORDER — SODIUM CHLORIDE 0.9 % (FLUSH) 0.9 %
5-40 SYRINGE (ML) INJECTION EVERY 12 HOURS SCHEDULED
Status: DISCONTINUED | OUTPATIENT
Start: 2024-12-09 | End: 2024-12-09 | Stop reason: HOSPADM

## 2024-12-09 RX ORDER — VERAPAMIL HYDROCHLORIDE 2.5 MG/ML
INJECTION, SOLUTION INTRAVENOUS PRN
Status: DISCONTINUED | OUTPATIENT
Start: 2024-12-09 | End: 2024-12-09 | Stop reason: HOSPADM

## 2024-12-09 RX ORDER — SODIUM CHLORIDE 0.9 % (FLUSH) 0.9 %
5-40 SYRINGE (ML) INJECTION EVERY 12 HOURS SCHEDULED
Status: DISCONTINUED | OUTPATIENT
Start: 2024-12-09 | End: 2024-12-10 | Stop reason: HOSPADM

## 2024-12-09 RX ORDER — SODIUM CHLORIDE 0.9 % (FLUSH) 0.9 %
5-40 SYRINGE (ML) INJECTION PRN
Status: DISCONTINUED | OUTPATIENT
Start: 2024-12-09 | End: 2024-12-09 | Stop reason: HOSPADM

## 2024-12-09 RX ORDER — ACETAMINOPHEN 650 MG/1
650 SUPPOSITORY RECTAL EVERY 6 HOURS PRN
Status: DISCONTINUED | OUTPATIENT
Start: 2024-12-09 | End: 2024-12-10 | Stop reason: SDUPTHER

## 2024-12-09 RX ORDER — ONDANSETRON 2 MG/ML
4 INJECTION INTRAMUSCULAR; INTRAVENOUS
Status: CANCELLED | OUTPATIENT
Start: 2024-12-09 | End: 2024-12-10

## 2024-12-09 RX ORDER — MEPERIDINE HYDROCHLORIDE 50 MG/ML
12.5 INJECTION INTRAMUSCULAR; INTRAVENOUS; SUBCUTANEOUS EVERY 5 MIN PRN
Status: CANCELLED | OUTPATIENT
Start: 2024-12-09

## 2024-12-09 RX ORDER — POLYETHYLENE GLYCOL 3350 17 G/17G
17 POWDER, FOR SOLUTION ORAL DAILY PRN
Status: DISCONTINUED | OUTPATIENT
Start: 2024-12-09 | End: 2024-12-10 | Stop reason: HOSPADM

## 2024-12-09 RX ORDER — SODIUM CHLORIDE 9 MG/ML
INJECTION, SOLUTION INTRAVENOUS PRN
Status: DISCONTINUED | OUTPATIENT
Start: 2024-12-09 | End: 2024-12-09 | Stop reason: HOSPADM

## 2024-12-09 RX ORDER — HEPARIN SODIUM 1000 [USP'U]/ML
INJECTION, SOLUTION INTRAVENOUS; SUBCUTANEOUS PRN
Status: DISCONTINUED | OUTPATIENT
Start: 2024-12-09 | End: 2024-12-09 | Stop reason: HOSPADM

## 2024-12-09 RX ORDER — FENTANYL CITRATE 50 UG/ML
INJECTION, SOLUTION INTRAMUSCULAR; INTRAVENOUS
Status: DISCONTINUED | OUTPATIENT
Start: 2024-12-09 | End: 2024-12-09 | Stop reason: SDUPTHER

## 2024-12-09 RX ORDER — ACETAMINOPHEN 325 MG/1
650 TABLET ORAL EVERY 4 HOURS PRN
Status: DISCONTINUED | OUTPATIENT
Start: 2024-12-09 | End: 2024-12-10 | Stop reason: HOSPADM

## 2024-12-09 RX ORDER — NITROGLYCERIN 20 MG/100ML
INJECTION INTRAVENOUS CONTINUOUS PRN
Status: COMPLETED | OUTPATIENT
Start: 2024-12-09 | End: 2024-12-09

## 2024-12-09 RX ORDER — ONDANSETRON 4 MG/1
4 TABLET, ORALLY DISINTEGRATING ORAL EVERY 8 HOURS PRN
Status: DISCONTINUED | OUTPATIENT
Start: 2024-12-09 | End: 2024-12-10 | Stop reason: HOSPADM

## 2024-12-09 RX ORDER — LABETALOL HYDROCHLORIDE 5 MG/ML
10 INJECTION, SOLUTION INTRAVENOUS
Status: CANCELLED | OUTPATIENT
Start: 2024-12-09

## 2024-12-09 RX ORDER — SODIUM CHLORIDE, SODIUM LACTATE, POTASSIUM CHLORIDE, CALCIUM CHLORIDE 600; 310; 30; 20 MG/100ML; MG/100ML; MG/100ML; MG/100ML
INJECTION, SOLUTION INTRAVENOUS
Status: DISCONTINUED | OUTPATIENT
Start: 2024-12-09 | End: 2024-12-09 | Stop reason: SDUPTHER

## 2024-12-09 RX ORDER — SODIUM CHLORIDE 9 MG/ML
INJECTION, SOLUTION INTRAVENOUS PRN
Status: CANCELLED | OUTPATIENT
Start: 2024-12-09

## 2024-12-09 RX ORDER — ACETAMINOPHEN 325 MG/1
650 TABLET ORAL EVERY 6 HOURS PRN
Status: DISCONTINUED | OUTPATIENT
Start: 2024-12-09 | End: 2024-12-10 | Stop reason: SDUPTHER

## 2024-12-09 RX ORDER — ATORVASTATIN CALCIUM 80 MG/1
80 TABLET, FILM COATED ORAL NIGHTLY
Status: DISCONTINUED | OUTPATIENT
Start: 2024-12-09 | End: 2024-12-10 | Stop reason: HOSPADM

## 2024-12-09 RX ORDER — ASPIRIN 81 MG/1
81 TABLET, CHEWABLE ORAL DAILY
Status: DISCONTINUED | OUTPATIENT
Start: 2024-12-10 | End: 2024-12-10 | Stop reason: HOSPADM

## 2024-12-09 RX ORDER — DEXAMETHASONE SODIUM PHOSPHATE 10 MG/ML
INJECTION INTRAMUSCULAR; INTRAVENOUS
Status: DISCONTINUED | OUTPATIENT
Start: 2024-12-09 | End: 2024-12-09 | Stop reason: SDUPTHER

## 2024-12-09 RX ORDER — ONDANSETRON 2 MG/ML
INJECTION INTRAMUSCULAR; INTRAVENOUS
Status: DISCONTINUED | OUTPATIENT
Start: 2024-12-09 | End: 2024-12-09 | Stop reason: SDUPTHER

## 2024-12-09 RX ORDER — IOPAMIDOL 755 MG/ML
INJECTION, SOLUTION INTRAVASCULAR PRN
Status: DISCONTINUED | OUTPATIENT
Start: 2024-12-09 | End: 2024-12-09 | Stop reason: HOSPADM

## 2024-12-09 RX ORDER — SODIUM CHLORIDE 0.9 % (FLUSH) 0.9 %
5-40 SYRINGE (ML) INJECTION PRN
Status: CANCELLED | OUTPATIENT
Start: 2024-12-09

## 2024-12-09 RX ORDER — OXYCODONE HYDROCHLORIDE 5 MG/1
10 TABLET ORAL PRN
Status: CANCELLED | OUTPATIENT
Start: 2024-12-09 | End: 2024-12-09

## 2024-12-09 RX ORDER — OXYCODONE HYDROCHLORIDE 5 MG/1
5 TABLET ORAL PRN
Status: CANCELLED | OUTPATIENT
Start: 2024-12-09 | End: 2024-12-09

## 2024-12-09 RX ORDER — LIDOCAINE HYDROCHLORIDE 20 MG/ML
INJECTION, SOLUTION INFILTRATION; PERINEURAL
Status: DISCONTINUED | OUTPATIENT
Start: 2024-12-09 | End: 2024-12-09 | Stop reason: SDUPTHER

## 2024-12-09 RX ORDER — ONDANSETRON 2 MG/ML
4 INJECTION INTRAMUSCULAR; INTRAVENOUS EVERY 6 HOURS PRN
Status: DISCONTINUED | OUTPATIENT
Start: 2024-12-09 | End: 2024-12-09 | Stop reason: HOSPADM

## 2024-12-09 RX ORDER — ROCURONIUM BROMIDE 10 MG/ML
INJECTION, SOLUTION INTRAVENOUS
Status: DISCONTINUED | OUTPATIENT
Start: 2024-12-09 | End: 2024-12-09 | Stop reason: SDUPTHER

## 2024-12-09 RX ORDER — ASPIRIN 325 MG
325 TABLET ORAL ONCE
Status: DISCONTINUED | OUTPATIENT
Start: 2024-12-09 | End: 2024-12-09 | Stop reason: HOSPADM

## 2024-12-09 RX ORDER — PROPOFOL 10 MG/ML
INJECTION, EMULSION INTRAVENOUS
Status: DISCONTINUED | OUTPATIENT
Start: 2024-12-09 | End: 2024-12-09 | Stop reason: SDUPTHER

## 2024-12-09 RX ORDER — LORAZEPAM 0.5 MG/1
0.5 TABLET ORAL
Status: DISCONTINUED | OUTPATIENT
Start: 2024-12-09 | End: 2024-12-09 | Stop reason: HOSPADM

## 2024-12-09 RX ORDER — ONDANSETRON 2 MG/ML
4 INJECTION INTRAMUSCULAR; INTRAVENOUS EVERY 6 HOURS PRN
Status: DISCONTINUED | OUTPATIENT
Start: 2024-12-09 | End: 2024-12-10 | Stop reason: HOSPADM

## 2024-12-09 RX ORDER — NALOXONE HYDROCHLORIDE 0.4 MG/ML
INJECTION, SOLUTION INTRAMUSCULAR; INTRAVENOUS; SUBCUTANEOUS PRN
Status: CANCELLED | OUTPATIENT
Start: 2024-12-09

## 2024-12-09 RX ADMIN — Medication 100 MCG: at 10:13

## 2024-12-09 RX ADMIN — NOREPINEPHRINE BITARTRATE 6.4 MCG: 1 INJECTION INTRAVENOUS at 10:01

## 2024-12-09 RX ADMIN — PROPOFOL 140 MG: 10 INJECTION, EMULSION INTRAVENOUS at 09:24

## 2024-12-09 RX ADMIN — NOREPINEPHRINE BITARTRATE 6.4 MCG: 1 INJECTION INTRAVENOUS at 12:49

## 2024-12-09 RX ADMIN — METHOCARBAMOL 300 MG: 100 INJECTION INTRAMUSCULAR; INTRAVENOUS at 14:08

## 2024-12-09 RX ADMIN — TICAGRELOR 90 MG: 90 TABLET ORAL at 09:05

## 2024-12-09 RX ADMIN — ATORVASTATIN CALCIUM 80 MG: 80 TABLET, FILM COATED ORAL at 20:11

## 2024-12-09 RX ADMIN — NOREPINEPHRINE BITARTRATE 6.4 MCG: 1 INJECTION INTRAVENOUS at 10:13

## 2024-12-09 RX ADMIN — NOREPINEPHRINE BITARTRATE 6.4 MCG: 1 INJECTION INTRAVENOUS at 13:27

## 2024-12-09 RX ADMIN — SODIUM CHLORIDE, SODIUM LACTATE, POTASSIUM CHLORIDE, AND CALCIUM CHLORIDE: .6; .31; .03; .02 INJECTION, SOLUTION INTRAVENOUS at 10:35

## 2024-12-09 RX ADMIN — Medication 200 MCG: at 11:03

## 2024-12-09 RX ADMIN — NOREPINEPHRINE BITARTRATE 6.4 MCG: 1 INJECTION INTRAVENOUS at 10:00

## 2024-12-09 RX ADMIN — SODIUM CHLORIDE: 9 INJECTION, SOLUTION INTRAVENOUS at 09:17

## 2024-12-09 RX ADMIN — NOREPINEPHRINE BITARTRATE 6.4 MCG: 1 INJECTION INTRAVENOUS at 10:11

## 2024-12-09 RX ADMIN — SODIUM CHLORIDE, PRESERVATIVE FREE 10 ML: 5 INJECTION INTRAVENOUS at 20:12

## 2024-12-09 RX ADMIN — Medication 100 MCG: at 10:01

## 2024-12-09 RX ADMIN — NOREPINEPHRINE BITARTRATE 6.4 MCG: 1 INJECTION INTRAVENOUS at 09:56

## 2024-12-09 RX ADMIN — NOREPINEPHRINE BITARTRATE 6.4 MCG: 1 INJECTION INTRAVENOUS at 13:48

## 2024-12-09 RX ADMIN — FENTANYL CITRATE 50 MCG: 50 INJECTION, SOLUTION INTRAMUSCULAR; INTRAVENOUS at 12:05

## 2024-12-09 RX ADMIN — Medication 200 MCG: at 11:18

## 2024-12-09 RX ADMIN — Medication 100 MCG: at 10:09

## 2024-12-09 RX ADMIN — Medication 200 MCG: at 09:52

## 2024-12-09 RX ADMIN — Medication 200 MCG: at 10:35

## 2024-12-09 RX ADMIN — Medication 200 MCG: at 10:20

## 2024-12-09 RX ADMIN — Medication 200 MCG: at 11:19

## 2024-12-09 RX ADMIN — Medication 200 MCG: at 11:38

## 2024-12-09 RX ADMIN — NOREPINEPHRINE BITARTRATE 6.4 MCG: 1 INJECTION INTRAVENOUS at 10:09

## 2024-12-09 RX ADMIN — ROCURONIUM BROMIDE 100 MG: 50 INJECTION, SOLUTION INTRAVENOUS at 09:24

## 2024-12-09 RX ADMIN — SUGAMMADEX 200 MG: 100 INJECTION, SOLUTION INTRAVENOUS at 14:02

## 2024-12-09 RX ADMIN — ROCURONIUM BROMIDE 30 MG: 50 INJECTION, SOLUTION INTRAVENOUS at 10:58

## 2024-12-09 RX ADMIN — TICAGRELOR 90 MG: 90 TABLET ORAL at 20:11

## 2024-12-09 RX ADMIN — Medication 200 MCG: at 12:48

## 2024-12-09 RX ADMIN — Medication 200 MCG: at 13:27

## 2024-12-09 RX ADMIN — NOREPINEPHRINE BITARTRATE 6.4 MCG: 1 INJECTION INTRAVENOUS at 12:21

## 2024-12-09 RX ADMIN — Medication 200 MCG: at 10:33

## 2024-12-09 RX ADMIN — Medication 200 MCG: at 12:19

## 2024-12-09 RX ADMIN — Medication 200 MCG: at 11:00

## 2024-12-09 RX ADMIN — Medication 200 MCG: at 10:05

## 2024-12-09 RX ADMIN — ROCURONIUM BROMIDE 20 MG: 50 INJECTION, SOLUTION INTRAVENOUS at 12:49

## 2024-12-09 RX ADMIN — Medication 200 MCG: at 11:56

## 2024-12-09 RX ADMIN — Medication 200 MCG: at 10:50

## 2024-12-09 RX ADMIN — FENTANYL CITRATE 50 MCG: 50 INJECTION, SOLUTION INTRAMUSCULAR; INTRAVENOUS at 14:06

## 2024-12-09 RX ADMIN — DEXAMETHASONE SODIUM PHOSPHATE 10 MG: 10 INJECTION INTRAMUSCULAR; INTRAVENOUS at 09:51

## 2024-12-09 RX ADMIN — Medication 200 MCG: at 09:47

## 2024-12-09 RX ADMIN — ROCURONIUM BROMIDE 20 MG: 50 INJECTION, SOLUTION INTRAVENOUS at 11:42

## 2024-12-09 RX ADMIN — METHOCARBAMOL 300 MG: 100 INJECTION INTRAMUSCULAR; INTRAVENOUS at 12:05

## 2024-12-09 RX ADMIN — LIDOCAINE HYDROCHLORIDE 60 MG: 20 INJECTION, SOLUTION INFILTRATION; PERINEURAL at 09:24

## 2024-12-09 RX ADMIN — FENTANYL CITRATE 50 MCG: 50 INJECTION, SOLUTION INTRAMUSCULAR; INTRAVENOUS at 12:48

## 2024-12-09 RX ADMIN — Medication 200 MCG: at 11:50

## 2024-12-09 RX ADMIN — Medication 200 MCG: at 13:30

## 2024-12-09 RX ADMIN — Medication 200 MCG: at 13:48

## 2024-12-09 RX ADMIN — ONDANSETRON 4 MG: 2 INJECTION INTRAMUSCULAR; INTRAVENOUS at 14:02

## 2024-12-09 RX ADMIN — Medication 200 MCG: at 11:58

## 2024-12-09 RX ADMIN — METHOCARBAMOL 200 MG: 100 INJECTION INTRAMUSCULAR; INTRAVENOUS at 12:48

## 2024-12-09 RX ADMIN — NOREPINEPHRINE BITARTRATE 6.4 MCG: 1 INJECTION INTRAVENOUS at 12:20

## 2024-12-09 RX ADMIN — NOREPINEPHRINE BITARTRATE 6.4 MCG: 1 INJECTION INTRAVENOUS at 13:30

## 2024-12-09 RX ADMIN — PHENYLEPHRINE HYDROCHLORIDE 25 MCG/MIN: 10 INJECTION INTRAVENOUS at 09:40

## 2024-12-09 RX ADMIN — Medication 100 MCG: at 10:11

## 2024-12-09 RX ADMIN — FENTANYL CITRATE 50 MCG: 50 INJECTION, SOLUTION INTRAMUSCULAR; INTRAVENOUS at 09:24

## 2024-12-09 RX ADMIN — FENTANYL CITRATE 50 MCG: 50 INJECTION, SOLUTION INTRAMUSCULAR; INTRAVENOUS at 13:14

## 2024-12-09 ASSESSMENT — PAIN SCALES - GENERAL: PAINLEVEL_OUTOF10: 0

## 2024-12-09 NOTE — ANESTHESIA POSTPROCEDURE EVALUATION
Department of Anesthesiology  Postprocedure Note    Patient: Chano Clay  MRN: [id number]  YOB: 1955  Date of evaluation: 12/9/2024    Procedure Summary       Date: 12/09/24 Room / Location: [facility] CATH LAB 1 / [facility] CARDIAC CATH LAB    Anesthesia Start: 0917 Anesthesia Stop: 1432    Procedures:       High risk percutaneous coronary intervention      Coronary angiography      Intravascular ultrasound      Percutaneous coronary intervention Diagnosis:       Atherosclerosis of native coronary artery of native heart, unspecified whether angina present      (Atherosclerosis of native coronary artery of native heart, unspecified whether angina present [I25.10])    Providers: Vidal Paul MD Responsible Provider: Chano Dee MD    Anesthesia Type: general ASA Status: 3            Anesthesia Type: No value filed.    Ender Phase I: Ender Score: 9    Ender Phase II:      Anesthesia Post Evaluation    Patient location during evaluation: PACU  Patient participation: complete - patient participated  Level of consciousness: awake and alert  Airway patency: patent  Nausea & Vomiting: no nausea and no vomiting  Cardiovascular status: blood pressure returned to baseline  Respiratory status: acceptable  Hydration status: euvolemic  Comments: VSS on transfer to phase 2 recovery.  No anesthetic complications.  Pain management: adequate    There were no known notable events for this encounter.

## 2024-12-09 NOTE — ANESTHESIA PRE PROCEDURE
Department of Anesthesiology  Preprocedure Note       Name:  Chano Clay   Age:  69 y.o.  :  1955                                          MRN:  [id number]         Date:  2024      Surgeon: Surgeon(s):  Vidal Paul MD    Procedure: Procedure(s):  High risk percutaneous coronary intervention    Medications prior to admission:   Prior to Admission medications    Medication Sig Start Date End Date Taking? Authorizing Provider   ticagrelor (BRILINTA) 90 MG TABS tablet Take 1 tablet by mouth 2 times daily 10/23/24   Ligia Shaw, APRN - CNP   aspirin 81 MG chewable tablet Take 1 tablet by mouth daily 10/11/24   Damaris Cosme MD   atorvastatin (LIPITOR) 80 MG tablet Take 1 tablet by mouth nightly 10/10/24   Damaris Cosme MD   metoprolol succinate (TOPROL XL) 25 MG extended release tablet Take 1 tablet by mouth in the morning and at bedtime 10/10/24   Damaris Cosme MD   valsartan (DIOVAN) 80 MG tablet Take 1 tablet by mouth daily 10/11/24   Damaris Cosme MD       Current medications:    Current Facility-Administered Medications   Medication Dose Route Frequency Provider Last Rate Last Admin   • sodium chloride flush 0.9 % injection 5-40 mL  5-40 mL IntraVENous 2 times per day Vidal Paul MD       • sodium chloride flush 0.9 % injection 5-40 mL  5-40 mL IntraVENous PRN Vidal Paul MD       • 0.9 % sodium chloride infusion   IntraVENous PRN Vidal Paul MD       • aspirin tablet 325 mg  325 mg Oral Once Vidal Paul MD       • ondansetron (ZOFRAN) injection 4 mg  4 mg IntraVENous Q6H PRN Vidal Paul MD       • LORazepam (ATIVAN) tablet 0.5 mg  0.5 mg Oral Once PRN Vidal Paul MD           Allergies:  No Known Allergies    Problem List:    Patient Active Problem List   Diagnosis Code   • Ischemic cardiomyopathy I25.5   • Coronary artery disease involving native coronary artery of native heart without angina pectoris I25.10       Past Medical History:        Diagnosis

## 2024-12-09 NOTE — H&P
history that includes Cardiac procedure (N/A, 10/5/2024); Cardiac procedure (N/A, 10/5/2024); and Cardiac procedure (N/A, 10/5/2024).  Allergies: No Known Allergies  Fam HX:  family history includes No Known Problems in his father and mother.  Soc HX:   Social History     Socioeconomic History    Marital status: Single   Tobacco Use    Smoking status: Never    Smokeless tobacco: Never   Vaping Use    Vaping status: Never Used   Substance and Sexual Activity    Alcohol use: Yes     Comment: 1-2 beers weekly    Drug use: Never       Medications:   Medications:    sodium chloride flush  5-40 mL IntraVENous 2 times per day    [START ON 12/10/2024] aspirin  81 mg Oral Daily    atorvastatin  80 mg Oral Nightly    sodium chloride flush  5-40 mL IntraVENous 2 times per day    [START ON 12/10/2024] ticagrelor  90 mg Oral BID      Infusions:    norepinephrine      sodium chloride      sodium chloride       PRN Meds: sodium chloride flush, 5-40 mL, PRN  sodium chloride, , PRN  acetaminophen, 650 mg, Q4H PRN  sodium chloride flush, 5-40 mL, PRN  sodium chloride, , PRN  ondansetron, 4 mg, Q8H PRN   Or  ondansetron, 4 mg, Q6H PRN  polyethylene glycol, 17 g, Daily PRN  acetaminophen, 650 mg, Q6H PRN   Or  acetaminophen, 650 mg, Q6H PRN        Labs      CBC:   Recent Labs     12/09/24  0735   WBC 5.2   HGB 13.6        BMP:    Recent Labs     12/09/24  0735      K 4.2      CO2 23   BUN 14   CREATININE 1.0   GLUCOSE 117*     Hepatic: No results for input(s): \"AST\", \"ALT\", \"BILITOT\", \"ALKPHOS\" in the last 72 hours.    Invalid input(s): \"ALB\"  Lipids:   Lab Results   Component Value Date/Time    CHOL 146 12/09/2024 07:35 AM    HDL 44 12/09/2024 07:35 AM    TRIG 77 12/09/2024 07:35 AM     Hemoglobin A1C: No results found for: \"LABA1C\"  TSH: No results found for: \"TSH\"  Troponin: No results found for: \"TROPONINT\"  Lactic Acid: No results for input(s): \"LACTA\" in the last 72 hours.  BNP: No results for input(s):

## 2024-12-09 NOTE — PROCEDURES
CARDIAC CATHETERIZATION REPORT     Procedure Date:  2024  Patient Name: Chano Clay  MRN: [id number] : 1955      INDICATION     Staged PCI  MV cad/ashd, surgical turndown s/p vfib arrest, lcx pci        PROCEDURES PERFORMED     Left heart catheterization  LVgram  Coronary angiogam  Coronary cath    IVUS of RCA  PCI of RCA with single drug-eluting stent    IVUS of left main  IVUS of LAD  IVUS of diagonal    PCI of LAD with 2 drug-eluting stents (using DK crush technique)  PCI of D1 and D2 with 2 drug-eluting stents (using DK crush technique)        PROCEDURE DESCRIPTION   Immediately before procedure, sedation assessment was performed again and prior findings as per sedation note (see that note for details) are unchanged. Risks/benefits/alternatives/outcomes were discussed with patient and/or family and informed consent was obtained.  Using the Barbeau scale, the patient's right radial artery was found to be a level b.  Patient was prepped draped in the usual sterile fashion.  Local anaesthetic was applied over puncture site.  Using a back wall technique, a 6/7 [place] Terumo sheath was inserted into right radial artery.  Verapamil, nitroglycerin, cardene were administered through the sheath.  Heparin was administered.  Diagnostic angiograms were previously obtained, see cath report from 2024.  Additional diagnostic angiograms were obtained with guide catheters as noted below.  Pigtail was used for left heart catheterization and LV gram.  At the conclusion of the procedure, a TR band was placed over the puncture site and hemostasis was obtained.  There were no immediate complications. I supervised sedation during the procedure. An independent trained observer pushed meds at my direction.  We monitored the patient's level of consciousness and vital signs/physiologic status throughout the procedure duration (see Cupid).   <20cc EBL.  I called pts son to provide updates on

## 2024-12-10 VITALS
SYSTOLIC BLOOD PRESSURE: 142 MMHG | WEIGHT: 197.53 LBS | HEART RATE: 92 BPM | OXYGEN SATURATION: 97 % | BODY MASS INDEX: 27.05 KG/M2 | TEMPERATURE: 97.9 F | DIASTOLIC BLOOD PRESSURE: 71 MMHG | RESPIRATION RATE: 14 BRPM

## 2024-12-10 PROBLEM — E78.00 PURE HYPERCHOLESTEROLEMIA: Status: ACTIVE | Noted: 2024-12-10

## 2024-12-10 LAB
ANION GAP SERPL CALCULATED.3IONS-SCNC: 11 MMOL/L (ref 3–16)
BASOPHILS # BLD: 0.1 K/UL (ref 0–0.2)
BASOPHILS NFR BLD: 0.3 %
BUN SERPL-MCNC: 16 MG/DL (ref 7–20)
CALCIUM SERPL-MCNC: 8.5 MG/DL (ref 8.3–10.6)
CHLORIDE SERPL-SCNC: 110 MMOL/L (ref 99–110)
CO2 SERPL-SCNC: 22 MMOL/L (ref 21–32)
CREAT SERPL-MCNC: 1.1 MG/DL (ref 0.8–1.3)
DEPRECATED RDW RBC AUTO: 13.7 % (ref 12.4–15.4)
EOSINOPHIL # BLD: 0 K/UL (ref 0–0.6)
EOSINOPHIL NFR BLD: 0 %
EST. AVERAGE GLUCOSE BLD GHB EST-MCNC: 102.5 MG/DL
GFR SERPLBLD CREATININE-BSD FMLA CKD-EPI: 74 ML/MIN/{1.73_M2}
GLUCOSE SERPL-MCNC: 112 MG/DL (ref 70–99)
HBA1C MFR BLD: 5.2 %
HCT VFR BLD AUTO: 35 % (ref 40.5–52.5)
HGB BLD-MCNC: 11.6 G/DL (ref 13.5–17.5)
LYMPHOCYTES # BLD: 1.2 K/UL (ref 1–5.1)
LYMPHOCYTES NFR BLD: 6.9 %
MCH RBC QN AUTO: 31.6 PG (ref 26–34)
MCHC RBC AUTO-ENTMCNC: 33.2 G/DL (ref 31–36)
MCV RBC AUTO: 95 FL (ref 80–100)
MONOCYTES # BLD: 1.2 K/UL (ref 0–1.3)
MONOCYTES NFR BLD: 6.5 %
NEUTROPHILS # BLD: 15.3 K/UL (ref 1.7–7.7)
NEUTROPHILS NFR BLD: 86.3 %
PLATELET # BLD AUTO: 190 K/UL (ref 135–450)
PMV BLD AUTO: 7.5 FL (ref 5–10.5)
POTASSIUM SERPL-SCNC: 4.5 MMOL/L (ref 3.5–5.1)
RBC # BLD AUTO: 3.69 M/UL (ref 4.2–5.9)
SODIUM SERPL-SCNC: 143 MMOL/L (ref 136–145)
WBC # BLD AUTO: 17.8 K/UL (ref 4–11)

## 2024-12-10 PROCEDURE — 99232 SBSQ HOSP IP/OBS MODERATE 35: CPT | Performed by: NURSE PRACTITIONER

## 2024-12-10 PROCEDURE — 6370000000 HC RX 637 (ALT 250 FOR IP)

## 2024-12-10 PROCEDURE — 6370000000 HC RX 637 (ALT 250 FOR IP): Performed by: INTERNAL MEDICINE

## 2024-12-10 PROCEDURE — 36415 COLL VENOUS BLD VENIPUNCTURE: CPT

## 2024-12-10 PROCEDURE — 83036 HEMOGLOBIN GLYCOSYLATED A1C: CPT

## 2024-12-10 PROCEDURE — 80048 BASIC METABOLIC PNL TOTAL CA: CPT

## 2024-12-10 PROCEDURE — 85025 COMPLETE CBC W/AUTO DIFF WBC: CPT

## 2024-12-10 RX ADMIN — ASPIRIN 81 MG: 81 TABLET, CHEWABLE ORAL at 09:45

## 2024-12-10 RX ADMIN — TICAGRELOR 90 MG: 90 TABLET ORAL at 09:45

## 2024-12-10 NOTE — PROGRESS NOTES
Brief Pre-Op Note/Sedation Assessment      Chano Clay  1955  [id number]  9:19 AM    Planned Procedure: Cardiac Catheterization Procedure  Post Procedure Plan: Return to same level of care  Consent: I have discussed with the patient and/or the patient representative the indication, alternatives, and the possible risks and/or complications of the planned procedure and the anesthesia methods. The patient and/or patient representative appear to understand and agree to proceed.    DISCUSSION OF CARDIAC CATHETERIZATION PROCEDURES: The procedures, indications, risks and alternatives have been discussed with the patient and, as appropriate, with the patient's guardian . Risks discussed included, but are not limited to, bleeding, development of blood clots/emboli, damage to blood vessels, renal failure, malignant cardiac arrhythmias, stroke, heart attack, emergent coronary bypass surgery, death, dye allergy.  The patient (and guardian as appropriate) expressed understanding of the aforementioned and wished to proceed.          Chief Complaint:   Anginal Equivalent  Dyspnea  Fatigue      Indications for Cath Procedure:  Presentation:  Cardiomyopathy and LV Dysfunction  2.  Anginal Classification within 2 weeks:  CCS III - Symptoms with everyday living activities, i.e., moderate limitation  3.  Angina Symptoms Assessment:  Typical Chest Pain  4.  Heart Failure Class within last 2 weeks:  Yes:  Heart Failure Type: Systolic Severity:  Class III - Symptoms of HF on less-than-ordinary exertion  5.  Cardiovascular Instability:  No    Prior Ischemic Workup/Eval:  Pre-Procedural Medications: Yes: Aspirin, Beta Blockers, and STATIN  2.   Stress Test Completed?  No    Does Patient need surgery?  Cath Valve Surgery:  No    Pre-Procedure Medical History:  Vital Signs:  BP (!) 147/90   Pulse 67   Wt 88 kg (194 lb)   SpO2 98%   BMI 26.57 kg/m²     Allergies:  No Known Allergies  Medications:    Current 
2 ml air removed at this time. 550 ml urine in urinal. Dinner ordered  
Apprx 700 ml of the post procedure bladder scan. Dr. Paul notified and ok with the patient recovering from anesthesia and sitting up with assistance. If unable to urinate at that time please notify  for further orders.   
Patient discharge completed. Discharge information included information on diagnosis including signs and symptoms, complications and when to seek medical attention. Information on new medications also provided included use for the medication, side effects and when to call the doctor. Patient verbalized understanding of all discharge information. PIVx 2 removed and hemostasis achieved. Tele removed and returned. Patient escorted out by staff with all documented belongings.  
Right radial TR band removed. Site cleaned and dried. Tegaderm and Arm board applied. Report called to RN for room 434. Updated on status. CM[facility] called and notified. Patient transferred to new room.   
Right radial site WNL. TR band in place.  
To recovery cath bay 2 post PACU. TR band in place. Arm board in place .Site with hemostasis. Awake/alert. VSS on room air. Will cont to monitor.  
sedation during the procedure. An independent trained observer pushed meds at my direction.  We monitored the patient's level of consciousness and vital signs/physiologic status throughout the procedure duration (see Cupid).   <20cc EBL.  I called pts son to provide updates on findings/plans.         FINDINGS      LVGRAM     LVEDP 15   GRADIENT ACROSS AORTIC VALVE None   LV FUNCTION EF 60%   WALL MOTION Normal   MITRAL REGURGITATION Mild            CORONARY ARTERIES     LM Essentially absent, there are essentially separate ostia of the LAD and circumflex, this vessel is very short, has less than 10% wlhqykop-lcu-gotvks stenosis         LAD Moderately calcified, proximal 50% stenosis, mid 80% stenosis, distal 50% stenosis.     D1 and D2 are medium sized vessels, D1 is the largest branch and has 60 to 70% proximal/mid stenosis  D2 is a small to medium sized vessel with 80% ktnceazj-dny-hpvcpr stenosis.         LCX There is a proximal/mid stent which is widely patent, there is 10 to 20% yomffcge-lay-hperct stenosis               RCA Dominant, moderately tortuous, proximal/mid 40% stenosis, distal 80% stenosis.         PERCUTANEOUS INTERVENTION DESCRIPTION      Patient was already on dual antiplatelet therapy with aspirin Brilinta, additional 90 mg of Brilinta was given before the procedure.  Heparin was used for anticoagulation, initially a 6 [place] AR-2 guiding cath was used to intubate the RCA.  Workhorse wire was used to cross the lesions in the RCA.  IVUS was performed and showed a minimal luminal diameter of 3.5 to 4 mm, as such, lesion was treated with a 3.5 [place] [employer] Revelo cutting balloon and stenting with [employer] frontier [place] 4 x 15 mm drug-eluting stent.  Stent was postdilated with 4 mm noncompliant balloon.  Follow-up IVUS showed good stent apposition/expansion, there was no residual dissection or thrombus noted.     Attention turned towards the left side and equipment was removed from

## 2024-12-10 NOTE — CARE COORDINATION
Case Management Assessment  Initial Evaluation    Date/Time of Evaluation: 12/10/2024 11:52 AM  Assessment Completed by: Laverne Prado RN    If patient is discharged prior to next notation, then this note serves as note for discharge by case management.    Patient Name: Chano Clay                   YOB: 1955  Diagnosis: Atherosclerosis of native coronary artery of native heart, unspecified whether angina present [I25.10]  Status post cardiac catheterization [Z98.890]  STEMI (ST elevation myocardial infarction) (HCC) [I21.3]                   Date / Time: 12/9/2024  7:04 AM    Patient Admission Status: Inpatient   Readmission Risk (Low < 19, Mod (19-27), High > 27): Readmission Risk Score: 10.3    Current PCP: Sunitha Jacobs APRN - NP  PCP verified by CM? (P) Yes    Chart Reviewed: Yes      History Provided by: (P) Patient  Patient Orientation: (P) Alert and Oriented    Patient Cognition: (P) Alert    Hospitalization in the last 30 days (Readmission):  No    If yes, Readmission Assessment in  Navigator will be completed.    Advance Directives:      Code Status: Full Code   Patient's Primary Decision Maker is: (P) Legal Next of Kin    Primary Decision Maker: Danny Clay - Chandrika - [phone]    Secondary Decision Maker: Milly Olivier - Brother/Sister - [phone]    Secondary Decision Maker: Genevieve Levine - Brother/Sister - [phone]    Discharge Planning:    Patient lives with: (P) Children, Family Members Type of Home: (P) Other (Comment) ([facility])  Primary Care Giver: (P) Self  Patient Support Systems include: (P) Children, Family Members   Current Financial resources: (P) Medicare  Current community resources: (P) None  Current services prior to admission: (P) None            Current DME:              Type of Home Care services:  (P) None    ADLS  Prior functional level: (P) Independent in ADLs/IADLs  Current functional level: (P) Independent in ADLs/IADLs    PT AM-PAC:   /24  OT

## 2024-12-10 NOTE — DISCHARGE SUMMARY
Lab Results   Component Value Date/Time    ORG Staphylococcus aureus 10/06/2024 09:00 AM    ORG Streptococcus pneumoniae 10/06/2024 09:00 AM       Time Spent Discharging patient 40 minutes    Electronically signed by Sharon Collins MD on 12/10/2024 at 1:38 PM

## 2024-12-10 NOTE — DISCHARGE INSTRUCTIONS
Consider staged high risk PCI versus CABG at a later date, this is less likely to be during this hospital admission but potentially at a later date pending recovery from this acute illness/cardiac arrest  Continue taking tablet aspirin and atorvastatin as prescribed  Continue taking tablet Brilinta as prescribed  Start taking metoprolol and valsartan as advised  Follow-up with cardiology regarding medication management.  Follow-up with PCP for HbA1c
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
## 2025-01-20 NOTE — PROGRESS NOTES
further assess EF s/p PCI, for ischemic cardiomyopathy   Be mindful of the weather: Avoid extreme temperatures that can strain the heart.  Blood Work: Lipid, LFT- fast for 8 hours prior to blood work.   Follow up with me in 1 year or sooner if needed.         Your provider has ordered testing for further evaluation.  An order/prescription has been included in your paper work.   To schedule outpatient testing, contact Central Scheduling by calling [address] [place] [address] ([phone]).          Scribe's attestation: This note was scribed in the presence of Dr. Vidal Paul M.D. By Andra Fam RN          I, Dr Vidal Paul, personally performed the services described in this documentation, as scribed by the above signed scribe in my presence.  It is both accurate and complete to my knowledge.  I agree with the details independently gathered by the clinical support staff and the scribed note accurately describes my personal service to the patient.          Thank you for allowing us to participate in the care of Chano Clay. Please call me with any questions [phone].    Vidal Paul MD, [facility]   Interventional Cardiologist  [facility]  [phone] [place] Office  [phone] [place] Office  1/21/2025 1:31 PM

## 2025-01-21 ENCOUNTER — OFFICE VISIT (OUTPATIENT)
Dept: CARDIOLOGY CLINIC | Age: 70
End: 2025-01-21
Payer: MEDICARE

## 2025-01-21 VITALS
DIASTOLIC BLOOD PRESSURE: 60 MMHG | WEIGHT: 193 LBS | SYSTOLIC BLOOD PRESSURE: 124 MMHG | HEIGHT: 72 IN | HEART RATE: 78 BPM | OXYGEN SATURATION: 98 % | BODY MASS INDEX: 26.14 KG/M2

## 2025-01-21 DIAGNOSIS — I25.5 ISCHEMIC CARDIOMYOPATHY: ICD-10-CM

## 2025-01-21 DIAGNOSIS — I21.3 ST ELEVATION MYOCARDIAL INFARCTION (STEMI), UNSPECIFIED ARTERY (HCC): Primary | ICD-10-CM

## 2025-01-21 DIAGNOSIS — I25.10 CORONARY ARTERY DISEASE INVOLVING NATIVE CORONARY ARTERY OF NATIVE HEART WITHOUT ANGINA PECTORIS: ICD-10-CM

## 2025-01-21 DIAGNOSIS — E78.00 PURE HYPERCHOLESTEROLEMIA: ICD-10-CM

## 2025-01-21 PROCEDURE — 1036F TOBACCO NON-USER: CPT | Performed by: INTERNAL MEDICINE

## 2025-01-21 PROCEDURE — 1159F MED LIST DOCD IN RCRD: CPT | Performed by: INTERNAL MEDICINE

## 2025-01-21 PROCEDURE — 99214 OFFICE O/P EST MOD 30 MIN: CPT | Performed by: INTERNAL MEDICINE

## 2025-01-21 PROCEDURE — 3017F COLORECTAL CA SCREEN DOC REV: CPT | Performed by: INTERNAL MEDICINE

## 2025-01-21 PROCEDURE — G8419 CALC BMI OUT NRM PARAM NOF/U: HCPCS | Performed by: INTERNAL MEDICINE

## 2025-01-21 PROCEDURE — 1123F ACP DISCUSS/DSCN MKR DOCD: CPT | Performed by: INTERNAL MEDICINE

## 2025-01-21 PROCEDURE — G8427 DOCREV CUR MEDS BY ELIG CLIN: HCPCS | Performed by: INTERNAL MEDICINE

## 2025-01-21 RX ORDER — ATORVASTATIN CALCIUM 80 MG/1
80 TABLET, FILM COATED ORAL NIGHTLY
Qty: 90 TABLET | Refills: 3 | Status: SHIPPED | OUTPATIENT
Start: 2025-01-21

## 2025-01-21 RX ORDER — VALSARTAN 80 MG/1
80 TABLET ORAL DAILY
Qty: 90 TABLET | Refills: 3 | Status: SHIPPED | OUTPATIENT
Start: 2025-01-21

## 2025-01-21 RX ORDER — METOPROLOL SUCCINATE 25 MG/1
25 TABLET, EXTENDED RELEASE ORAL 2 TIMES DAILY
Qty: 90 TABLET | Refills: 3 | Status: SHIPPED | OUTPATIENT
Start: 2025-01-21

## 2025-01-21 NOTE — PATIENT INSTRUCTIONS
Continue current cardiac medications: Aspirin 81 mg, Lipitor 80 mg, Metoprolol 25 mg, Brilinta 90 mg, Valsartan 80 mg  Medications reviewed. Medications refilled as warranted.   Discussed referral to Cardiac Rehab: order placed.  Echocardiogram to assess heart size and function to further assess EF s/p PCI.  Be mindful of the weather: Avoid extreme temperatures that can strain the heart.  Blood Work: Lipid, LFT- fast for 8 hours prior to blood work.   Follow up with me in 1 year or sooner if needed.         Your provider has ordered testing for further evaluation.  An order/prescription has been included in your paper work.   To schedule outpatient testing, contact Central Scheduling by calling [address] [place] [address] ([phone]).

## 2025-02-07 DIAGNOSIS — E78.00 PURE HYPERCHOLESTEROLEMIA: ICD-10-CM

## 2025-02-07 LAB
ALBUMIN SERPL-MCNC: 4.4 G/DL (ref 3.4–5)
ALP SERPL-CCNC: 112 U/L (ref 40–129)
ALT SERPL-CCNC: 22 U/L (ref 10–40)
AST SERPL-CCNC: 22 U/L (ref 15–37)
BILIRUB DIRECT SERPL-MCNC: 0.2 MG/DL (ref 0–0.3)
BILIRUB INDIRECT SERPL-MCNC: 0.3 MG/DL (ref 0–1)
BILIRUB SERPL-MCNC: 0.5 MG/DL (ref 0–1)
CHOLEST SERPL-MCNC: 182 MG/DL (ref 0–199)
HDLC SERPL-MCNC: 46 MG/DL (ref 40–60)
LDL CHOLESTEROL: 110 MG/DL
PROT SERPL-MCNC: 7.5 G/DL (ref 6.4–8.2)
TRIGL SERPL-MCNC: 131 MG/DL (ref 0–150)
VLDLC SERPL CALC-MCNC: 26 MG/DL

## 2025-02-11 DIAGNOSIS — Z98.890 STATUS POST CARDIAC CATHETERIZATION: ICD-10-CM

## 2025-02-11 DIAGNOSIS — E78.00 PURE HYPERCHOLESTEROLEMIA: ICD-10-CM

## 2025-02-11 DIAGNOSIS — Z79.899 MEDICATION MANAGEMENT: Primary | ICD-10-CM

## 2025-02-11 DIAGNOSIS — I25.10 CORONARY ARTERY DISEASE INVOLVING NATIVE CORONARY ARTERY OF NATIVE HEART WITHOUT ANGINA PECTORIS: ICD-10-CM

## 2025-02-11 RX ORDER — ROSUVASTATIN CALCIUM 40 MG/1
40 TABLET, COATED ORAL NIGHTLY
Qty: 90 TABLET | Refills: 1 | Status: SHIPPED | OUTPATIENT
Start: 2025-02-11

## 2025-02-11 NOTE — RESULT ENCOUNTER NOTE
Spoke with patient. Relayed SRJ results. Patient V/U. Patient reports is was taking Lipitor 80 mg daily as prescribed. Instructed to discontinue therapy. Start rosuvastatin 40 mg q hs and repeat fasting lipids per SRJ in 1-2 months at [facility]. Verified pharmacy sent as instructed.

## 2025-04-01 DIAGNOSIS — Z79.899 MEDICATION MANAGEMENT: ICD-10-CM

## 2025-04-01 LAB
ALBUMIN SERPL-MCNC: 4.1 G/DL (ref 3.4–5)
ALP SERPL-CCNC: 89 U/L (ref 40–129)
ALT SERPL-CCNC: 27 U/L (ref 10–40)
AST SERPL-CCNC: 25 U/L (ref 15–37)
BILIRUB DIRECT SERPL-MCNC: 0.2 MG/DL (ref 0–0.3)
BILIRUB INDIRECT SERPL-MCNC: 0.3 MG/DL (ref 0–1)
BILIRUB SERPL-MCNC: 0.5 MG/DL (ref 0–1)
CHOLEST SERPL-MCNC: 125 MG/DL (ref 0–199)
HDLC SERPL-MCNC: 44 MG/DL (ref 40–60)
LDL CHOLESTEROL: 57 MG/DL
PROT SERPL-MCNC: 6.9 G/DL (ref 6.4–8.2)
TRIGL SERPL-MCNC: 119 MG/DL (ref 0–150)
VLDLC SERPL CALC-MCNC: 24 MG/DL

## 2025-04-02 ENCOUNTER — RESULTS FOLLOW-UP (OUTPATIENT)
Dept: CARDIOLOGY | Age: 70
End: 2025-04-02

## 2025-08-29 DIAGNOSIS — E78.00 PURE HYPERCHOLESTEROLEMIA: ICD-10-CM

## 2025-08-29 DIAGNOSIS — I25.10 CORONARY ARTERY DISEASE INVOLVING NATIVE CORONARY ARTERY OF NATIVE HEART WITHOUT ANGINA PECTORIS: ICD-10-CM

## 2025-08-29 RX ORDER — ROSUVASTATIN CALCIUM 40 MG/1
40 TABLET, COATED ORAL NIGHTLY
Qty: 90 TABLET | Refills: 1 | Status: SHIPPED | OUTPATIENT
Start: 2025-08-29

## (undated) DEVICE — RADPAD

## (undated) DEVICE — PTCA DILATATION CATHETER: Brand: NC EMERGE®

## (undated) DEVICE — CATHETER ANGIOPLSTY BAL L12MM DIA1.5MM GWIRE 0.014IN RAP

## (undated) DEVICE — CATHETER GUID 5FR L100CM DIA0.058IN NYL SHFT EBU3.0 W/O

## (undated) DEVICE — SOLUTION IV HEPARIN SODIUM SODIUM CHL 0.9% 500 ML INJ VIAFLX

## (undated) DEVICE — GLIDESHEATH SLENDER ACCESS KIT: Brand: GLIDESHEATH SLENDER

## (undated) DEVICE — COVIDIEN  WHOLEY GUIDE WIRE

## (undated) DEVICE — MICROCATHETER: Brand: MAMBA™ FLEX

## (undated) DEVICE — MICROSURGICAL DILATATION DEVICE: Brand: WOLVERINE CORONARY CUTTING BALLOON

## (undated) DEVICE — CORONARY IMAGING CATHETER: Brand: OPTICROSS™ 6 HD

## (undated) DEVICE — CATHETER COR DIAG PIGTAILS PIG 145 CRV 5FR 110CM 6 SIDE H

## (undated) DEVICE — GUIDE CATHETER: Brand: MACH1™

## (undated) DEVICE — CATH BLLN ANGIO 3X8MM NC EUPHORIA RX

## (undated) DEVICE — EXCHANGE DEVICE: Brand: TRAPPER™

## (undated) DEVICE — CATHETER GUID AD 6FR L100CM COR PERIPH GRN NYL PTFE AR 2

## (undated) DEVICE — CATH LAB PACK: Brand: MEDLINE INDUSTRIES, INC.

## (undated) DEVICE — ISOVUE 370 50ML

## (undated) DEVICE — PTCA DILATATION CATHETER: Brand: EMERGE™

## (undated) DEVICE — GUIDEWIRE VASC L260CM DIA0.035IN STR TIP FLPY PROF WHLY

## (undated) DEVICE — CATHETER BLLN SAPPHIRE II PRO 1.0 X15MM

## (undated) DEVICE — Device: Brand: FIELDER XT

## (undated) DEVICE — 5FR MEDTRONIC ULTRA 4.0 CATHETER

## (undated) DEVICE — GUIDEWIRE WITH ICE™ HYDROPHILIC COATING: Brand: CHOICE™ PT

## (undated) DEVICE — TR BAND RADIAL ARTERY COMPRESSION DEVICE: Brand: TR BAND

## (undated) DEVICE — CATHETER ANGIOPLSTY RX 2.25X15 MM TAKERUTM

## (undated) DEVICE — DEVICE US SLED PUL BK DISP ILAB

## (undated) DEVICE — CATH BLLN ANGIO 3X12MM NC EUPHORIA RX

## (undated) DEVICE — GUIDEWIRE WITH ICE™ HYDROPHILIC COATING: Brand: CHOICE™

## (undated) DEVICE — CATH BLLN ANGIO 3.50X20MM NC EUPHORIA RX

## (undated) DEVICE — Device: Brand: PROWATER

## (undated) DEVICE — RUNTHROUGH NS EXTRA FLOPPY PTCA GUIDEWIRE: Brand: RUNTHROUGH

## (undated) DEVICE — DEVICE INFL W/ HEM VLV TORQ

## (undated) DEVICE — Device: Brand: ASAHI SION BLUE

## (undated) DEVICE — Device

## (undated) DEVICE — CATH BLLN ANGIO 2.50X8MM NC EUPHORIA RX

## (undated) DEVICE — GUIDEWIRE VASC L260CM DIA0.035IN RAD 3MM J TIP L7CM PTFE

## (undated) DEVICE — DISPOSABLE PULLBACK SLED FOR MOTORDRIVE

## (undated) DEVICE — VALVE HEMSTAT 8FR INNR LUMN CRSS SLT SEAL DSGN WATCHDOG

## (undated) DEVICE — CATHETERIZATION KIT 7 FRX16 CM 3L

## (undated) DEVICE — HI-TORQUE WIGGLE GUIDE WIRE .014 STRAIGHT TIP 2.0 CM X 190 CM: Brand: HI-TORQUE WIGGLE